# Patient Record
Sex: MALE | Race: WHITE | NOT HISPANIC OR LATINO | Employment: OTHER | URBAN - METROPOLITAN AREA
[De-identification: names, ages, dates, MRNs, and addresses within clinical notes are randomized per-mention and may not be internally consistent; named-entity substitution may affect disease eponyms.]

---

## 2017-11-13 ENCOUNTER — OUTSIDE FACILITY SERVICE (OUTPATIENT)
Dept: NEUROLOGY | Facility: CLINIC | Age: 52
End: 2017-11-13

## 2017-11-13 PROCEDURE — 95886 MUSC TEST DONE W/N TEST COMP: CPT | Performed by: PSYCHIATRY & NEUROLOGY

## 2017-11-13 PROCEDURE — 95908 NRV CNDJ TST 3-4 STUDIES: CPT | Performed by: PSYCHIATRY & NEUROLOGY

## 2022-03-29 ENCOUNTER — OFFICE VISIT (OUTPATIENT)
Dept: INTERNAL MEDICINE | Facility: CLINIC | Age: 57
End: 2022-03-29

## 2022-03-29 VITALS
BODY MASS INDEX: 34.83 KG/M2 | WEIGHT: 286 LBS | TEMPERATURE: 97.7 F | HEART RATE: 65 BPM | SYSTOLIC BLOOD PRESSURE: 124 MMHG | HEIGHT: 76 IN | DIASTOLIC BLOOD PRESSURE: 74 MMHG | OXYGEN SATURATION: 96 %

## 2022-03-29 DIAGNOSIS — H61.23 BILATERAL IMPACTED CERUMEN: ICD-10-CM

## 2022-03-29 DIAGNOSIS — R73.03 PREDIABETES: ICD-10-CM

## 2022-03-29 DIAGNOSIS — K76.0 FATTY LIVER: ICD-10-CM

## 2022-03-29 DIAGNOSIS — R16.0 ENLARGED LIVER: ICD-10-CM

## 2022-03-29 DIAGNOSIS — E79.0 ELEVATED URIC ACID IN BLOOD: ICD-10-CM

## 2022-03-29 DIAGNOSIS — R79.89 ELEVATED FERRITIN: ICD-10-CM

## 2022-03-29 DIAGNOSIS — Z00.00 HEALTH CARE MAINTENANCE: Primary | ICD-10-CM

## 2022-03-29 DIAGNOSIS — Z00.00 HEALTH CARE MAINTENANCE: ICD-10-CM

## 2022-03-29 DIAGNOSIS — I10 PRIMARY HYPERTENSION: ICD-10-CM

## 2022-03-29 PROBLEM — H90.5 SENSORINEURAL HEARING LOSS OF LEFT EAR: Status: ACTIVE | Noted: 2017-01-23

## 2022-03-29 PROBLEM — S54.02XA NEUROPRAXIA OF LEFT ULNAR NERVE: Status: ACTIVE | Noted: 2019-05-28

## 2022-03-29 PROBLEM — H43.10 VITREOUS HEMORRHAGE (HCC): Status: ACTIVE | Noted: 2022-03-29

## 2022-03-29 PROBLEM — H93.19 TINNITUS: Status: ACTIVE | Noted: 2017-01-23

## 2022-03-29 PROBLEM — M51.36 DDD (DEGENERATIVE DISC DISEASE), LUMBAR: Status: ACTIVE | Noted: 2019-05-28

## 2022-03-29 PROBLEM — H16.8 NEUROTROPHIC KERATITIS OF LEFT EYE: Status: ACTIVE | Noted: 2019-05-28

## 2022-03-29 PROBLEM — H40.059 INCREASED PRESSURE IN THE EYE: Status: ACTIVE | Noted: 2022-03-29

## 2022-03-29 PROBLEM — M51.369 DDD (DEGENERATIVE DISC DISEASE), LUMBAR: Status: ACTIVE | Noted: 2019-05-28

## 2022-03-29 PROBLEM — M17.9 OA (OSTEOARTHRITIS) OF KNEE: Status: ACTIVE | Noted: 2019-05-28

## 2022-03-29 PROCEDURE — 99386 PREV VISIT NEW AGE 40-64: CPT | Performed by: NURSE PRACTITIONER

## 2022-03-29 PROCEDURE — 93000 ELECTROCARDIOGRAM COMPLETE: CPT | Performed by: NURSE PRACTITIONER

## 2022-03-29 RX ORDER — LORATADINE 10 MG/1
CAPSULE, LIQUID FILLED ORAL
COMMUNITY

## 2022-03-29 RX ORDER — LOSARTAN POTASSIUM 100 MG/1
TABLET ORAL
COMMUNITY
Start: 2021-12-21 | End: 2022-10-06

## 2022-03-29 RX ORDER — HYDROCHLOROTHIAZIDE 50 MG/1
TABLET ORAL
COMMUNITY
Start: 2022-01-19

## 2022-03-29 RX ORDER — MELATONIN
2000 DAILY
COMMUNITY

## 2022-03-29 NOTE — PROGRESS NOTES
Subjective   Sal Gamboa is a 56 y.o. male. HTN    History of Present Illness   The patient is here today for CPE and lab work F/U. He feels well. Feels his health is good. [facility]   He is down about 21 lbs in the last 6 months.   New patient to establish care.      HTN- Pt is doing well with current medication regimen, denies adverse reactions, compliant with medication schedule.     Elevated liver enzymes- pt reports US confirmed fatty liver, [facility] on this  Ferritin high at 527  He has been eating a low iron diet due to hx of elevated iron. Neg for HH.   Following with GI, neg autoimmune work up    AR- takes loratadine   The following portions of the patient's history were reviewed and updated as appropriate: allergies, current medications, past family history, past medical history, past social history, past surgical history and problem list.    Review of Systems   Constitutional: Negative for chills and fever.   HENT: Negative for ear pain, rhinorrhea and sore throat.    Eyes: Positive for visual disturbance (occ eye dryness).   Respiratory: Negative for cough and shortness of breath.    Cardiovascular: Negative.    Gastrointestinal: Negative.    Endocrine: Negative for cold intolerance and heat intolerance.   Genitourinary: Negative for decreased urine volume, difficulty urinating, discharge, dysuria, flank pain, frequency, genital sores, hematuria, penile pain, erectile dysfunction, testicular pain and urgency.   Musculoskeletal: Positive for back pain (intermittently ).   Skin: Negative.    Allergic/Immunologic: Negative for environmental allergies and food allergies.   Neurological: Negative.    Hematological: Negative.    Psychiatric/Behavioral: Negative for dysphoric mood and suicidal ideas. The patient is not nervous/anxious.        Objective   Physical Exam  Constitutional:       Appearance: Normal appearance. He is well-developed.   HENT:      Right  Ear: Hearing, tympanic membrane, ear canal and external ear normal. There is impacted cerumen.      Left Ear: Hearing, tympanic membrane, ear canal and external ear normal. There is impacted cerumen.      Nose: Nose normal.      Mouth/Throat:      Pharynx: Uvula midline.   Eyes:      General: Lids are normal.      Conjunctiva/sclera: Conjunctivae normal.      Pupils: Pupils are equal, round, and reactive to light.   Neck:      Thyroid: No thyromegaly.      Vascular: No carotid bruit.      Trachea: Trachea normal.   Cardiovascular:      Rate and Rhythm: Normal rate and regular rhythm.      Heart sounds: Normal heart sounds.   Pulmonary:      Effort: Pulmonary effort is normal.      Breath sounds: Normal breath sounds.   Chest:   Breasts:      Right: No supraclavicular adenopathy.      Left: No supraclavicular adenopathy.       Abdominal:      General: Bowel sounds are normal.      Palpations: Abdomen is soft.      Tenderness: There is no abdominal tenderness.   Musculoskeletal:         General: Normal range of motion.      Cervical back: Normal range of motion and neck supple.   Lymphadenopathy:      Cervical: No cervical adenopathy.      Upper Body:      Right upper body: No supraclavicular adenopathy.      Left upper body: No supraclavicular adenopathy.   Skin:     General: Skin is warm and dry.   Neurological:      Mental Status: He is alert and oriented to person, place, and time.      Cranial Nerves: No cranial nerve deficit.      Sensory: No sensory deficit.      Deep Tendon Reflexes:      Reflex Scores:       Patellar reflexes are 2+ on the right side and 2+ on the left side.  Psychiatric:         Speech: Speech normal.         Behavior: Behavior normal.         Thought Content: Thought content normal.         Judgment: Judgment normal.         Vitals:    03/29/22 1401   BP: 124/74   Pulse:    Temp:    SpO2:      Body mass index is 34.81 kg/m².      Assessment/Plan   Diagnoses and all orders for this  visit:    1. Health care maintenance (Primary)  -     Cancel: CBC & Differential; Future  -     Cancel: Comprehensive Metabolic Panel; Future  -     Cancel: Lipid Panel With LDL / HDL Ratio; Future  -     Cancel: TSH Rfx On Abnormal To Free T4; Future  -     Cancel: Vitamin D 25 Hydroxy; Future  -     Cancel: PSA DIAGNOSTIC; Future  -     Cancel: Ferritin; Future  -     Cancel: Hemoglobin A1c; Future  -     Cancel: Iron Profile; Future  -     CBC & Differential; Future  -     Comprehensive Metabolic Panel; Future  -     Hemoglobin A1c; Future  -     Ferritin; Future  -     Iron Profile; Future  -     Lipid Panel With LDL / HDL Ratio; Future  -     PSA DIAGNOSTIC; Future  -     TSH Rfx On Abnormal To Free T4; Future  -     Vitamin D 25 Hydroxy; Future  -     Uric Acid; Future    2. Elevated ferritin  -     Cancel: CBC & Differential; Future  -     Cancel: Comprehensive Metabolic Panel; Future  -     Cancel: Lipid Panel With LDL / HDL Ratio; Future  -     Cancel: TSH Rfx On Abnormal To Free T4; Future  -     Cancel: Vitamin D 25 Hydroxy; Future  -     Cancel: PSA DIAGNOSTIC; Future  -     Cancel: Ferritin; Future  -     Cancel: Hemoglobin A1c; Future  -     Cancel: Iron Profile; Future  -     CBC & Differential; Future  -     Comprehensive Metabolic Panel; Future  -     Hemoglobin A1c; Future  -     Ferritin; Future  -     Iron Profile; Future  -     Lipid Panel With LDL / HDL Ratio; Future  -     PSA DIAGNOSTIC; Future  -     TSH Rfx On Abnormal To Free T4; Future  -     Vitamin D 25 Hydroxy; Future  -     Uric Acid; Future    3. Bilateral impacted cerumen  -     Cancel: CBC & Differential; Future  -     Cancel: Comprehensive Metabolic Panel; Future  -     Cancel: Lipid Panel With LDL / HDL Ratio; Future  -     Cancel: TSH Rfx On Abnormal To Free T4; Future  -     Cancel: Vitamin D 25 Hydroxy; Future  -     Cancel: PSA DIAGNOSTIC; Future  -     Cancel: Ferritin; Future  -     Cancel: Hemoglobin A1c; Future  -      Cancel: Iron Profile; Future    4. Prediabetes  -     Cancel: CBC & Differential; Future  -     Cancel: Comprehensive Metabolic Panel; Future  -     Cancel: Lipid Panel With LDL / HDL Ratio; Future  -     Cancel: TSH Rfx On Abnormal To Free T4; Future  -     Cancel: Vitamin D 25 Hydroxy; Future  -     Cancel: PSA DIAGNOSTIC; Future  -     Cancel: Ferritin; Future  -     Cancel: Hemoglobin A1c; Future  -     Cancel: Iron Profile; Future  -     CBC & Differential; Future  -     Comprehensive Metabolic Panel; Future  -     Hemoglobin A1c; Future  -     Ferritin; Future  -     Iron Profile; Future  -     Lipid Panel With LDL / HDL Ratio; Future  -     PSA DIAGNOSTIC; Future  -     TSH Rfx On Abnormal To Free T4; Future  -     Vitamin D 25 Hydroxy; Future  -     Uric Acid; Future    5. Fatty liver  -     Cancel: CBC & Differential; Future  -     Cancel: Comprehensive Metabolic Panel; Future  -     Cancel: Lipid Panel With LDL / HDL Ratio; Future  -     Cancel: TSH Rfx On Abnormal To Free T4; Future  -     Cancel: Vitamin D 25 Hydroxy; Future  -     Cancel: PSA DIAGNOSTIC; Future  -     Cancel: Ferritin; Future  -     Cancel: Hemoglobin A1c; Future  -     Cancel: Iron Profile; Future  -     CBC & Differential; Future  -     Comprehensive Metabolic Panel; Future  -     Hemoglobin A1c; Future  -     Ferritin; Future  -     Iron Profile; Future  -     Lipid Panel With LDL / HDL Ratio; Future  -     PSA DIAGNOSTIC; Future  -     TSH Rfx On Abnormal To Free T4; Future  -     Vitamin D 25 Hydroxy; Future  -     Uric Acid; Future    6. Enlarged liver  -     Cancel: CBC & Differential; Future  -     Cancel: Comprehensive Metabolic Panel; Future  -     Cancel: Lipid Panel With LDL / HDL Ratio; Future  -     Cancel: TSH Rfx On Abnormal To Free T4; Future  -     Cancel: Vitamin D 25 Hydroxy; Future  -     Cancel: PSA DIAGNOSTIC; Future  -     Cancel: Ferritin; Future  -     Cancel: Hemoglobin A1c; Future  -     Cancel: Iron Profile;  Future  -     CBC & Differential; Future  -     Comprehensive Metabolic Panel; Future  -     Hemoglobin A1c; Future  -     Ferritin; Future  -     Iron Profile; Future  -     Lipid Panel With LDL / HDL Ratio; Future  -     PSA DIAGNOSTIC; Future  -     TSH Rfx On Abnormal To Free T4; Future  -     Vitamin D 25 Hydroxy; Future  -     Uric Acid; Future    7. Primary hypertension  -     Cancel: CBC & Differential; Future  -     Cancel: Comprehensive Metabolic Panel; Future  -     Cancel: Lipid Panel With LDL / HDL Ratio; Future  -     Cancel: TSH Rfx On Abnormal To Free T4; Future  -     Cancel: Vitamin D 25 Hydroxy; Future  -     Cancel: PSA DIAGNOSTIC; Future  -     Cancel: Ferritin; Future  -     Cancel: Hemoglobin A1c; Future  -     Cancel: Iron Profile; Future  -     CBC & Differential; Future  -     Comprehensive Metabolic Panel; Future  -     Hemoglobin A1c; Future  -     Ferritin; Future  -     Iron Profile; Future  -     Lipid Panel With LDL / HDL Ratio; Future  -     PSA DIAGNOSTIC; Future  -     TSH Rfx On Abnormal To Free T4; Future  -     Vitamin D 25 Hydroxy; Future  -     Uric Acid; Future    8. Elevated uric acid in blood  -     CBC & Differential; Future  -     Comprehensive Metabolic Panel; Future  -     Hemoglobin A1c; Future  -     Ferritin; Future  -     Iron Profile; Future  -     Lipid Panel With LDL / HDL Ratio; Future  -     PSA DIAGNOSTIC; Future  -     TSH Rfx On Abnormal To Free T4; Future  -     Vitamin D 25 Hydroxy; Future  -     Uric Acid; Future          ECG 12 Lead    Date/Time: 3/29/2022 4:46 PM  Performed by: Cynthia Bernal APRN  Authorized by: Cynthia Bernal APRN   Previous ECG: no previous ECG available  Rhythm: sinus rhythm  Rate: normal  Conduction: conduction normal  QRS axis: normal    Clinical impression: normal ECG                   1. Preventative counseling- work on further wt loss through healthy diet and exercise.   2. Elevated iron- recheck lab in June  3. Gout- check lab  in June  4. Impacted cerumen- can flush today - this did not get done  5. Prediabetes- low carb diet and exercise, check lab in June   6. Fatty liver/enlarged liver- continue to f/u with GI, work on wt loss  7. HTN- well controlled    [facility], [facility]  “Discussed risks/benefits to vaccination, reviewed components of the vaccine, discussed VIS, discussed informed consent, informed consent obtained. Patient/Parent was allowed to accept or refuse vaccine. Questions answered to satisfactory state of patient/Parent. We reviewed typical age appropriate and seasonally appropriate vaccinations. Reviewed immunization history and updated state vaccination form as needed. Patient was counseled on Zoster  COVID19

## 2022-03-29 NOTE — PATIENT INSTRUCTIONS
1. Preventative counseling- work on further wt loss through healthy diet and exercise.   2. Elevated iron- recheck lab  3. Gout- check lab  4. Impacted cerumen- flush today   5. Prediabetes- low carb diet and exercise, check lab in June   6. Fatty liver/enlarged liver- continue to f/u with GI, work on wt loss  7. HTN- well controlled    c-[facility]- [facility], has never [facility]

## 2022-06-30 LAB
25(OH)D3+25(OH)D2 SERPL-MCNC: 26.6 NG/ML (ref 30–100)
ALBUMIN SERPL-MCNC: 4.5 G/DL (ref 3.8–4.9)
ALBUMIN/GLOB SERPL: 1.4 {RATIO} (ref 1.2–2.2)
ALP SERPL-CCNC: 52 IU/L (ref 44–121)
ALT SERPL-CCNC: 48 IU/L (ref 0–44)
AST SERPL-CCNC: 34 IU/L (ref 0–40)
BASOPHILS # BLD AUTO: 0.1 X10E3/UL (ref 0–0.2)
BASOPHILS NFR BLD AUTO: 1 %
BILIRUB SERPL-MCNC: 0.5 MG/DL (ref 0–1.2)
BUN SERPL-MCNC: 11 MG/DL (ref 6–24)
BUN/CREAT SERPL: 10 (ref 9–20)
CALCIUM SERPL-MCNC: 9.8 MG/DL (ref 8.7–10.2)
CHLORIDE SERPL-SCNC: 100 MMOL/L (ref 96–106)
CHOLEST SERPL-MCNC: 171 MG/DL (ref 100–199)
CO2 SERPL-SCNC: 25 MMOL/L (ref 20–29)
CREAT SERPL-MCNC: 1.06 MG/DL (ref 0.76–1.27)
EGFRCR SERPLBLD CKD-EPI 2021: 81 ML/MIN/1.73
EOSINOPHIL # BLD AUTO: 0.2 X10E3/UL (ref 0–0.4)
EOSINOPHIL NFR BLD AUTO: 3 %
ERYTHROCYTE [DISTWIDTH] IN BLOOD BY AUTOMATED COUNT: 12.5 % (ref 11.6–15.4)
FERRITIN SERPL-MCNC: 444 NG/ML (ref 30–400)
GLOBULIN SER CALC-MCNC: 3.2 G/DL (ref 1.5–4.5)
GLUCOSE SERPL-MCNC: 111 MG/DL (ref 65–99)
HBA1C MFR BLD: 6.1 % (ref 4.8–5.6)
HCT VFR BLD AUTO: 46.6 % (ref 37.5–51)
HDLC SERPL-MCNC: 40 MG/DL
HGB BLD-MCNC: 16.2 G/DL (ref 13–17.7)
IMM GRANULOCYTES # BLD AUTO: 0 X10E3/UL (ref 0–0.1)
IMM GRANULOCYTES NFR BLD AUTO: 0 %
IRON SATN MFR SERPL: 14 % (ref 15–55)
IRON SERPL-MCNC: 52 UG/DL (ref 38–169)
LDLC SERPL CALC-MCNC: 104 MG/DL (ref 0–99)
LDLC/HDLC SERPL: 2.6 RATIO (ref 0–3.6)
LYMPHOCYTES # BLD AUTO: 3.9 X10E3/UL (ref 0.7–3.1)
LYMPHOCYTES NFR BLD AUTO: 41 %
MCH RBC QN AUTO: 30.7 PG (ref 26.6–33)
MCHC RBC AUTO-ENTMCNC: 34.8 G/DL (ref 31.5–35.7)
MCV RBC AUTO: 88 FL (ref 79–97)
MONOCYTES # BLD AUTO: 1.4 X10E3/UL (ref 0.1–0.9)
MONOCYTES NFR BLD AUTO: 15 %
NEUTROPHILS # BLD AUTO: 3.7 X10E3/UL (ref 1.4–7)
NEUTROPHILS NFR BLD AUTO: 40 %
PLATELET # BLD AUTO: 316 X10E3/UL (ref 150–450)
POTASSIUM SERPL-SCNC: 4.1 MMOL/L (ref 3.5–5.2)
PROT SERPL-MCNC: 7.7 G/DL (ref 6–8.5)
PSA SERPL-MCNC: 0.9 NG/ML (ref 0–4)
RBC # BLD AUTO: 5.27 X10E6/UL (ref 4.14–5.8)
SODIUM SERPL-SCNC: 140 MMOL/L (ref 134–144)
TIBC SERPL-MCNC: 379 UG/DL (ref 250–450)
TRIGL SERPL-MCNC: 151 MG/DL (ref 0–149)
TSH SERPL DL<=0.005 MIU/L-ACNC: 3.73 UIU/ML (ref 0.45–4.5)
UIBC SERPL-MCNC: 327 UG/DL (ref 111–343)
URATE SERPL-MCNC: 9.1 MG/DL (ref 3.8–8.4)
VLDLC SERPL CALC-MCNC: 27 MG/DL (ref 5–40)
WBC # BLD AUTO: 9.4 X10E3/UL (ref 3.4–10.8)

## 2022-07-06 ENCOUNTER — OFFICE VISIT (OUTPATIENT)
Dept: INTERNAL MEDICINE | Facility: CLINIC | Age: 57
End: 2022-07-06

## 2022-07-06 VITALS
OXYGEN SATURATION: 96 % | SYSTOLIC BLOOD PRESSURE: 112 MMHG | HEIGHT: 76 IN | TEMPERATURE: 97.8 F | HEART RATE: 71 BPM | BODY MASS INDEX: 34.95 KG/M2 | DIASTOLIC BLOOD PRESSURE: 68 MMHG | WEIGHT: 287 LBS

## 2022-07-06 DIAGNOSIS — K76.0 FATTY LIVER: ICD-10-CM

## 2022-07-06 DIAGNOSIS — E55.9 VITAMIN D DEFICIENCY: ICD-10-CM

## 2022-07-06 DIAGNOSIS — R73.03 PREDIABETES: Primary | ICD-10-CM

## 2022-07-06 DIAGNOSIS — E79.0 ELEVATED URIC ACID IN BLOOD: ICD-10-CM

## 2022-07-06 DIAGNOSIS — R79.89 ELEVATED FERRITIN: ICD-10-CM

## 2022-07-06 PROBLEM — B35.1 ONYCHOMYCOSIS: Status: ACTIVE | Noted: 2022-07-06

## 2022-07-06 PROCEDURE — 99214 OFFICE O/P EST MOD 30 MIN: CPT | Performed by: NURSE PRACTITIONER

## 2022-07-06 RX ORDER — ORAL SEMAGLUTIDE 7 MG/1
7 TABLET ORAL DAILY
Qty: 30 TABLET | Refills: 0 | Status: SHIPPED | OUTPATIENT
Start: 2022-07-06 | End: 2022-08-01

## 2022-07-06 NOTE — PROGRESS NOTES
Subjective   Sal Gamboa is a 57 y.o. male.  htn    History of Present Illness   The patient is here today to F/U on lab work. Just started with stationary bike.     Elevated liver enzymes- pt reports US confirmed fatty liver, [facility] on [facility]  Ferritin high at 527  He has been eating a low iron diet due to hx of elevated iron. Neg for HH.   Following with GI, neg autoimmune work up    HTN-Pt is doing well with current medication regimen, denies adverse reactions, compliant with medication schedule.   BP at home running well, had a few highs in June but daily in July syst <130/ 60-80s    Pt notes for years he has had trouble with his great left toe. It has grown quite long, is thick ended and discolored.     He recently had his shingles vaccines.   The following portions of the patient's history were reviewed and updated as appropriate: allergies, current medications, past family history, past medical history, past social history, past surgical history and problem list.    Review of Systems   Constitutional: Negative for chills and fever.   HENT: Positive for sneezing.    Respiratory: Negative.    Cardiovascular: Negative.    Psychiatric/Behavioral: Negative for dysphoric mood and suicidal ideas. The patient is not nervous/anxious.        Objective   Physical Exam  Constitutional:       Appearance: Normal appearance. He is well-developed.   Neck:      Thyroid: No thyromegaly.   Cardiovascular:      Rate and Rhythm: Normal rate and regular rhythm.      Heart sounds: Normal heart sounds.   Pulmonary:      Effort: Pulmonary effort is normal.      Breath sounds: Normal breath sounds.   Musculoskeletal:      Cervical back: Normal range of motion and neck supple.   Feet:      Right foot:      Toenail Condition: Right toenails are abnormally thick and long.      Left foot:      Toenail Condition: Left toenails are abnormally thick and long. Fungal disease present.  Lymphadenopathy:      Cervical: No  cervical adenopathy.   Skin:     General: Skin is warm and dry.   Neurological:      Mental Status: He is alert.   Psychiatric:         Behavior: Behavior normal.         Thought Content: Thought content normal.         Judgment: Judgment normal.         Vitals:    07/06/22 0855   BP: 112/68   Pulse: 71   Temp: 97.8 °F (36.6 °C)   SpO2: 96%     Body mass index is 34.95 kg/m².    Current Outpatient Medications:   •  cholecalciferol (VITAMIN D3) 25 MCG (1000 UT) tablet, Take 2,000 Units by mouth Daily., Disp: , Rfl:   •  hydroCHLOROthiazide (HYDRODIURIL) 50 MG tablet, , Disp: , Rfl:   •  Loratadine 10 MG capsule, Take  by mouth., Disp: , Rfl:   •  losartan (COZAAR) 100 MG tablet, , Disp: , Rfl:   •  Semaglutide (Rybelsus) 7 MG tablet, Take 7 mg by mouth Daily., Disp: 30 tablet, Rfl: 0   Orders Only on 03/29/2022   Component Date Value Ref Range Status   • Uric Acid 06/29/2022 9.1 (A) 3.8 - 8.4 mg/dL Final               Therapeutic target for gout patients: <6.0   • 25 Hydroxy, Vitamin D 06/29/2022 26.6 (A) 30.0 - 100.0 ng/mL Final    Comment: Vitamin D deficiency has been defined by the [place] of  Medicine and an Endocrine Society practice guideline as a  level of serum 25-OH vitamin D less than 20 ng/mL (1,2).  The Endocrine Society went on to further define vitamin D  insufficiency as a level between 21 and 29 ng/mL (2).  1. IOM ([place] of [employer]). 2010. Dietary reference     intakes for calcium and D. [place]: The     National Academies Press.  2. Yeison MF, Yakov NC, Beny MILES, et al.     Evaluation, treatment, and prevention of vitamin D     deficiency: an Endocrine Society clinical practice     guideline. JCEM. 2011 Jul; 96(7):1911-30.     • TSH 06/29/2022 3.730  0.450 - 4.500 uIU/mL Final   • PSA 06/29/2022 0.9  0.0 - 4.0 ng/mL Final    Comment: Roche ECLIA methodology.  According to the American Urological Association, Serum PSA should  decrease and remain at undetectable levels after  radical  prostatectomy. The AUA defines biochemical recurrence as an initial  PSA value 0.2 ng/mL or greater followed by a subsequent confirmatory  PSA value 0.2 ng/mL or greater.  Values obtained with different assay methods or kits cannot be used  interchangeably. Results cannot be interpreted as absolute evidence  of the presence or absence of malignant disease.     • Total Cholesterol 06/29/2022 171  100 - 199 mg/dL Final   • Triglycerides 06/29/2022 151 (A) 0 - 149 mg/dL Final   • HDL Cholesterol 06/29/2022 40  >39 mg/dL Final   • VLDL Cholesterol Brian 06/29/2022 27  5 - 40 mg/dL Final   • LDL Chol Calc (NIH) 06/29/2022 104 (A) 0 - 99 mg/dL Final   • LDL/HDL RATIO 06/29/2022 2.6  0.0 - 3.6 ratio Final    Comment:                                     LDL/HDL Ratio                                              Men  Women                                1/2 Avg.Risk  1.0    1.5                                    Avg.Risk  3.6    3.2                                 2X Avg.Risk  6.2    5.0                                 3X Avg.Risk  8.0    6.1     • TIBC 06/29/2022 379  250 - 450 ug/dL Final   • UIBC 06/29/2022 327  111 - 343 ug/dL Final   • Iron 06/29/2022 52  38 - 169 ug/dL Final   • Iron Saturation 06/29/2022 14 (A) 15 - 55 % Final   • Ferritin 06/29/2022 444 (A) 30 - 400 ng/mL Final   • Hemoglobin A1C 06/29/2022 6.1 (A) 4.8 - 5.6 % Final    Comment:          Prediabetes: 5.7 - 6.4           Diabetes: >6.4           Glycemic control for adults with diabetes: <7.0     • Glucose 06/29/2022 111 (A) 65 - 99 mg/dL Final   • BUN 06/29/2022 11  6 - 24 mg/dL Final   • Creatinine 06/29/2022 1.06  0.76 - 1.27 mg/dL Final   • EGFR Result 06/29/2022 81  >59 mL/min/1.73 Final   • BUN/Creatinine Ratio 06/29/2022 10  9 - 20 Final   • Sodium 06/29/2022 140  134 - 144 mmol/L Final   • Potassium 06/29/2022 4.1  3.5 - 5.2 mmol/L Final   • Chloride 06/29/2022 100  96 - 106 mmol/L Final   • Total CO2 06/29/2022 25  20 - 29 mmol/L Final    • Calcium 06/29/2022 9.8  8.7 - 10.2 mg/dL Final   • Total Protein 06/29/2022 7.7  6.0 - 8.5 g/dL Final   • Albumin 06/29/2022 4.5  3.8 - 4.9 g/dL Final   • Globulin 06/29/2022 3.2  1.5 - 4.5 g/dL Final   • A/G Ratio 06/29/2022 1.4  1.2 - 2.2 Final   • Total Bilirubin 06/29/2022 0.5  0.0 - 1.2 mg/dL Final   • Alkaline Phosphatase 06/29/2022 52  44 - 121 IU/L Final   • AST (SGOT) 06/29/2022 34  0 - 40 IU/L Final   • ALT (SGPT) 06/29/2022 48 (A) 0 - 44 IU/L Final   • WBC 06/29/2022 9.4  3.4 - 10.8 x10E3/uL Final   • RBC 06/29/2022 5.27  4.14 - 5.80 x10E6/uL Final   • Hemoglobin 06/29/2022 16.2  13.0 - 17.7 g/dL Final   • Hematocrit 06/29/2022 46.6  37.5 - 51.0 % Final   • MCV 06/29/2022 88  79 - 97 fL Final   • MCH 06/29/2022 30.7  26.6 - 33.0 pg Final   • MCHC 06/29/2022 34.8  31.5 - 35.7 g/dL Final   • RDW 06/29/2022 12.5  11.6 - 15.4 % Final   • Platelets 06/29/2022 316  150 - 450 x10E3/uL Final   • Neutrophil Rel % 06/29/2022 40  Not Estab. % Final   • Lymphocyte Rel % 06/29/2022 41  Not Estab. % Final   • Monocyte Rel % 06/29/2022 15  Not Estab. % Final   • Eosinophil Rel % 06/29/2022 3  Not Estab. % Final   • Basophil Rel % 06/29/2022 1  Not Estab. % Final   • Neutrophils Absolute 06/29/2022 3.7  1.4 - 7.0 x10E3/uL Final   • Lymphocytes Absolute 06/29/2022 3.9 (A) 0.7 - 3.1 x10E3/uL Final   • Monocytes Absolute 06/29/2022 1.4 (A) 0.1 - 0.9 x10E3/uL Final   • Eosinophils Absolute 06/29/2022 0.2  0.0 - 0.4 x10E3/uL Final   • Basophils Absolute 06/29/2022 0.1  0.0 - 0.2 x10E3/uL Final   • Immature Granulocyte Rel % 06/29/2022 0  Not Estab. % Final   • Immature Grans Absolute 06/29/2022 0.0  0.0 - 0.1 x10E3/uL Final       Assessment & Plan   Diagnoses and all orders for this visit:    1. Prediabetes (Primary)  -     Comprehensive Metabolic Panel; Future  -     Hemoglobin A1c; Future  -     Uric Acid; Future  -     Vitamin D 25 Hydroxy; Future  -     Ferritin; Future  -     Semaglutide (Rybelsus) 7 MG tablet; Take  7 mg by mouth Daily.  Dispense: 30 tablet; Refill: 0    2. Fatty liver  -     Comprehensive Metabolic Panel; Future  -     Hemoglobin A1c; Future  -     Uric Acid; Future  -     Vitamin D 25 Hydroxy; Future  -     Ferritin; Future    3. Elevated ferritin  -     Comprehensive Metabolic Panel; Future  -     Hemoglobin A1c; Future  -     Uric Acid; Future  -     Vitamin D 25 Hydroxy; Future  -     Ferritin; Future    4. Vitamin D deficiency  -     Comprehensive Metabolic Panel; Future  -     Hemoglobin A1c; Future  -     Uric Acid; Future  -     Vitamin D 25 Hydroxy; Future  -     Ferritin; Future    5. Elevated uric acid in blood  -     Comprehensive Metabolic Panel; Future  -     Hemoglobin A1c; Future  -     Uric Acid; Future  -     Vitamin D 25 Hydroxy; Future  -     Ferritin; Future               1. Prediabetes- stable, suggest GLP1, he is aware of dosing and SEs  2. Fatty liver- liver enzymes improved, continue to work on wt loss  3. Elevated ferritin-  Improving, continue to work on wt loss  4. Onychomycosis- will refer to podiatry, pt is aware he should not take oral anti fungals  5. Vit D def- he has restarted his supp   6. Gout- he has never taken allopurinol, hydrate well, recheck in 3 months if still high will start med   Answers for HPI/ROS submitted by the patient on 6/29/2022  What is the primary reason for your visit?: Physical

## 2022-07-06 NOTE — PATIENT INSTRUCTIONS
1. Prediabetes- stable, suggest GLP1, he is aware of dosing and SEs  2. Fatty liver- liver enzymes improved, continue to work on wt loss  3. Elevated ferritin-  Improving, continue to work on wt loss  4. Onychomycosis- will refer to podiatry, pt is aware he should not take oral anti fungals  5. Vit D def- he has restarted his supp   6. Gout- he has never taken allopurinol, hydrate well, recheck in 3 months if still high will start med

## 2022-08-01 RX ORDER — ORAL SEMAGLUTIDE 14 MG/1
14 TABLET ORAL DAILY
Qty: 30 TABLET | Refills: 3 | Status: SHIPPED | OUTPATIENT
Start: 2022-08-01 | End: 2022-08-31

## 2022-09-08 ENCOUNTER — TELEPHONE (OUTPATIENT)
Dept: GASTROENTEROLOGY | Facility: CLINIC | Age: 57
End: 2022-09-08

## 2022-09-26 ENCOUNTER — TELEPHONE (OUTPATIENT)
Dept: GASTROENTEROLOGY | Facility: CLINIC | Age: 57
End: 2022-09-26

## 2022-09-26 DIAGNOSIS — E79.0 ELEVATED URIC ACID IN BLOOD: ICD-10-CM

## 2022-09-26 DIAGNOSIS — K76.0 FATTY LIVER: ICD-10-CM

## 2022-09-26 DIAGNOSIS — E55.9 VITAMIN D DEFICIENCY: ICD-10-CM

## 2022-09-26 DIAGNOSIS — R73.03 PREDIABETES: ICD-10-CM

## 2022-09-26 DIAGNOSIS — R79.89 ELEVATED FERRITIN: ICD-10-CM

## 2022-09-26 NOTE — TELEPHONE ENCOUNTER
Referral from PCP for fecal abnormalities    PCP noted states following GI with negative autoimmune work up.     LVM for pt to call to discuss this appears to be [facility] (sc[facility] into media).    Referral fro [facility]: due to FIT+and with out [facility] in past 5 years

## 2022-09-27 LAB
25(OH)D3+25(OH)D2 SERPL-MCNC: 31.5 NG/ML (ref 30–100)
ALBUMIN SERPL-MCNC: 4.4 G/DL (ref 3.5–5.2)
ALBUMIN/GLOB SERPL: 1.6 G/DL
ALP SERPL-CCNC: 47 U/L (ref 39–117)
ALT SERPL-CCNC: 51 U/L (ref 1–41)
AST SERPL-CCNC: 30 U/L (ref 1–40)
BILIRUB SERPL-MCNC: 0.7 MG/DL (ref 0–1.2)
BUN SERPL-MCNC: 10 MG/DL (ref 6–20)
BUN/CREAT SERPL: 10.5 (ref 7–25)
CALCIUM SERPL-MCNC: 9.3 MG/DL (ref 8.6–10.5)
CHLORIDE SERPL-SCNC: 100 MMOL/L (ref 98–107)
CO2 SERPL-SCNC: 26.2 MMOL/L (ref 22–29)
CREAT SERPL-MCNC: 0.95 MG/DL (ref 0.76–1.27)
EGFRCR SERPLBLD CKD-EPI 2021: 92.8 ML/MIN/1.73
FERRITIN SERPL-MCNC: 454 NG/ML (ref 30–400)
GLOBULIN SER CALC-MCNC: 2.7 GM/DL
GLUCOSE SERPL-MCNC: 99 MG/DL (ref 65–99)
HBA1C MFR BLD: 5.8 % (ref 4.8–5.6)
POTASSIUM SERPL-SCNC: 3.5 MMOL/L (ref 3.5–5.2)
PROT SERPL-MCNC: 7.1 G/DL (ref 6–8.5)
SODIUM SERPL-SCNC: 139 MMOL/L (ref 136–145)
URATE SERPL-MCNC: 9.6 MG/DL (ref 3.4–7)

## 2022-10-06 ENCOUNTER — OFFICE VISIT (OUTPATIENT)
Dept: INTERNAL MEDICINE | Facility: CLINIC | Age: 57
End: 2022-10-06

## 2022-10-06 VITALS
WEIGHT: 256 LBS | OXYGEN SATURATION: 98 % | TEMPERATURE: 97.3 F | HEART RATE: 101 BPM | DIASTOLIC BLOOD PRESSURE: 80 MMHG | BODY MASS INDEX: 31.17 KG/M2 | SYSTOLIC BLOOD PRESSURE: 124 MMHG | HEIGHT: 76 IN

## 2022-10-06 DIAGNOSIS — I15.9 SECONDARY HYPERTENSION: Primary | ICD-10-CM

## 2022-10-06 DIAGNOSIS — E55.9 VITAMIN D DEFICIENCY: ICD-10-CM

## 2022-10-06 DIAGNOSIS — E79.0 ELEVATED URIC ACID IN BLOOD: ICD-10-CM

## 2022-10-06 DIAGNOSIS — K76.0 FATTY LIVER: ICD-10-CM

## 2022-10-06 DIAGNOSIS — R73.03 PREDIABETES: ICD-10-CM

## 2022-10-06 DIAGNOSIS — R79.89 ELEVATED FERRITIN: ICD-10-CM

## 2022-10-06 PROCEDURE — 99214 OFFICE O/P EST MOD 30 MIN: CPT | Performed by: NURSE PRACTITIONER

## 2022-10-06 RX ORDER — ORAL SEMAGLUTIDE 14 MG/1
14 TABLET ORAL DAILY
Qty: 90 TABLET | Refills: 1 | Status: SHIPPED | OUTPATIENT
Start: 2022-10-06 | End: 2022-10-10 | Stop reason: SDUPTHER

## 2022-10-06 RX ORDER — ORAL SEMAGLUTIDE 14 MG/1
TABLET ORAL
COMMUNITY
Start: 2022-09-06 | End: 2022-10-06 | Stop reason: SDUPTHER

## 2022-10-06 RX ORDER — ALLOPURINOL 100 MG/1
100 TABLET ORAL DAILY
Qty: 30 TABLET | Refills: 6 | Status: SHIPPED | OUTPATIENT
Start: 2022-10-06 | End: 2022-10-10 | Stop reason: SDUPTHER

## 2022-10-06 NOTE — PROGRESS NOTES
Subjective   Sal Gamboa is a 57 y.o. male.  htn    History of Present Illness   The patient is here today to F/U on lab work. Feeling well with wt loss.   Prediabetes/obesity- feels his appetite has improved and has lost 31 lbs.   [facility]- [facility] were [facility] with his improvement  He will get c[facility]scope through [facility]. Working on paperwork through Dr. York.    He is seeing podiatry to get his great nail on the right foot fixed on Tuesday.     HTN- still taking HCTZ, has taken losartan twice since September due to light headedness  The following portions of the patient's history were reviewed and updated as appropriate: allergies, current medications, past family history, past medical history, past social history, past surgical history and problem list.    Review of Systems   Constitutional: Negative for chills and fever.   Respiratory: Negative.    Cardiovascular: Negative.    Neurological: Positive for light-headedness (in September).   Psychiatric/Behavioral: Negative for dysphoric mood and suicidal ideas. The patient is not nervous/anxious.        Objective   Physical Exam  Constitutional:       Appearance: Normal appearance. He is well-developed.   Neck:      Thyroid: No thyromegaly.   Cardiovascular:      Rate and Rhythm: Normal rate and regular rhythm.      Heart sounds: Normal heart sounds.   Pulmonary:      Effort: Pulmonary effort is normal.      Breath sounds: Normal breath sounds.   Musculoskeletal:      Cervical back: Normal range of motion and neck supple.   Lymphadenopathy:      Cervical: No cervical adenopathy.   Skin:     General: Skin is warm and dry.   Neurological:      Mental Status: He is alert.   Psychiatric:         Behavior: Behavior normal.         Thought Content: Thought content normal.         Judgment: Judgment normal.         Vitals:    10/06/22 1006   BP: 124/80   Pulse:    Temp:    SpO2:      Body mass index is 31.17 kg/m².    Current Outpatient  Medications:   •  cholecalciferol (VITAMIN D3) 25 MCG (1000 UT) tablet, Take 2,000 Units by mouth Daily., Disp: , Rfl:   •  hydroCHLOROthiazide (HYDRODIURIL) 50 MG tablet, , Disp: , Rfl:   •  Loratadine 10 MG capsule, Take  by mouth., Disp: , Rfl:   •  Rybelsus 14 MG tablet, Take 1 tablet by mouth Daily., Disp: 90 tablet, Rfl: 1  •  allopurinol (ZYLOPRIM) 100 MG tablet, Take 1 tablet by mouth Daily., Disp: 30 tablet, Rfl: 6   Orders Only on 09/26/2022   Component Date Value Ref Range Status   • 25 Hydroxy, Vitamin D 09/26/2022 31.5  30.0 - 100.0 ng/ml Final    Comment: Results may be falsely increased if patient taking Biotin.  Reference Range for Total Vitamin D 25(OH)  Deficiency <20.0 ng/mL  Insufficiency 21-29 ng/mL  Sufficiency  ng/mL  Toxicity >100 ng/ml     • Uric Acid 09/26/2022 9.6 (A) 3.4 - 7.0 mg/dL Final   • Hemoglobin A1C 09/26/2022 5.80 (A) 4.80 - 5.60 % Final    Comment: Hemoglobin A1C Ranges:  Increased Risk for Diabetes  5.7% to 6.4%  Diabetes                     >= 6.5%  Diabetic Goal                < 7.0%     • Glucose 09/26/2022 99  65 - 99 mg/dL Final   • BUN 09/26/2022 10  6 - 20 mg/dL Final   • Creatinine 09/26/2022 0.95  0.76 - 1.27 mg/dL Final   • EGFR Result 09/26/2022 92.8  >60.0 mL/min/1.73 Final    Comment: National Kidney Foundation and American Society of  Nephrology (ASN) Task Force recommended calculation based  on the Chronic Kidney Disease Epidemiology Collaboration  (CKD-EPI) equation refit without adjustment for race.  GFR Normal >60  Chronic Kidney Disease <60  Kidney Failure <15     • BUN/Creatinine Ratio 09/26/2022 10.5  7.0 - 25.0 Final   • Sodium 09/26/2022 139  136 - 145 mmol/L Final   • Potassium 09/26/2022 3.5  3.5 - 5.2 mmol/L Final   • Chloride 09/26/2022 100  98 - 107 mmol/L Final   • Total CO2 09/26/2022 26.2  22.0 - 29.0 mmol/L Final   • Calcium 09/26/2022 9.3  8.6 - 10.5 mg/dL Final   • Total Protein 09/26/2022 7.1  6.0 - 8.5 g/dL Final   • Albumin 09/26/2022  4.40  3.50 - 5.20 g/dL Final   • Globulin 09/26/2022 2.7  gm/dL Final   • A/G Ratio 09/26/2022 1.6  g/dL Final   • Total Bilirubin 09/26/2022 0.7  0.0 - 1.2 mg/dL Final   • Alkaline Phosphatase 09/26/2022 47  39 - 117 U/L Final   • AST (SGOT) 09/26/2022 30  1 - 40 U/L Final   • ALT (SGPT) 09/26/2022 51 (A) 1 - 41 U/L Final   • Ferritin 09/26/2022 454.00 (A) 30.00 - 400.00 ng/mL Final    Results may be falsely decreased if patient taking Biotin.       Assessment & Plan   Diagnoses and all orders for this visit:    1. Secondary hypertension (Primary)    2. Elevated ferritin    3. Fatty liver    4. Prediabetes    5. Vitamin D deficiency    6. Elevated uric acid in blood  -     Uric Acid; Future    Other orders  -     Rybelsus 14 MG tablet; Take 1 tablet by mouth Daily.  Dispense: 90 tablet; Refill: 1  -     allopurinol (ZYLOPRIM) 100 MG tablet; Take 1 tablet by mouth Daily.  Dispense: 30 tablet; Refill: 6               1. HTN- continue off losartan if syst <110 cut HCTZ in half, notify for syst >140 or diast>90  2. Elevated ferritin- [facility] Hereditary hemochromatosis was negative. Discussed hem referral, pt defers for now  3. Elevated ALT- UTD with GI, improving ,  Has had sig wt loss  4. Prediabetes- improved with wt loss, continue with rybelsus  5. Elevated uric acid- start allopurinol at 100 mg daily, recheck in 3 wks  6. Vit D def- double current intake    [facility]

## 2022-10-10 RX ORDER — ORAL SEMAGLUTIDE 14 MG/1
14 TABLET ORAL DAILY
Qty: 90 TABLET | Refills: 1 | Status: SHIPPED | OUTPATIENT
Start: 2022-10-10 | End: 2023-02-27

## 2022-10-10 RX ORDER — ALLOPURINOL 100 MG/1
100 TABLET ORAL DAILY
Qty: 90 TABLET | Refills: 1 | Status: SHIPPED | OUTPATIENT
Start: 2022-10-10 | End: 2022-12-01

## 2022-10-19 ENCOUNTER — PREP FOR SURGERY (OUTPATIENT)
Dept: SURGERY | Facility: SURGERY CENTER | Age: 57
End: 2022-10-19

## 2022-10-19 ENCOUNTER — TELEPHONE (OUTPATIENT)
Dept: GASTROENTEROLOGY | Facility: CLINIC | Age: 57
End: 2022-10-19

## 2022-10-19 DIAGNOSIS — R19.5 POSITIVE COLORECTAL CANCER SCREENING USING COLOGUARD TEST: Primary | ICD-10-CM

## 2022-10-19 NOTE — TELEPHONE ENCOUNTER
FAST TRACK - [facility] REFERRAL  1ST COLONOSCOPY  POSITIVE COLOGUARD  NO FAMILY HISTORY CRC/P  SCHEDULE AT [facility].

## 2022-10-21 PROBLEM — R19.5 POSITIVE COLORECTAL CANCER SCREENING USING COLOGUARD TEST: Status: ACTIVE | Noted: 2022-10-21

## 2022-10-21 NOTE — TELEPHONE ENCOUNTER
Return call from patient.  Scheduled at [place] 12/13/2022 at 3:30pm - arrive at 2:30pm.  E-mail instructions [email or web address] today.

## 2022-11-02 DIAGNOSIS — E79.0 ELEVATED URIC ACID IN BLOOD: Primary | ICD-10-CM

## 2022-12-01 ENCOUNTER — TELEPHONE (OUTPATIENT)
Dept: INTERNAL MEDICINE | Facility: CLINIC | Age: 57
End: 2022-12-01

## 2022-12-01 RX ORDER — ALLOPURINOL 300 MG/1
300 TABLET ORAL DAILY
Qty: 90 TABLET | Refills: 1 | Status: SHIPPED | OUTPATIENT
Start: 2022-12-01 | End: 2023-03-01

## 2022-12-09 RX ORDER — LOSARTAN POTASSIUM 100 MG/1
100 TABLET ORAL DAILY PRN
COMMUNITY

## 2022-12-13 ENCOUNTER — HOSPITAL ENCOUNTER (OUTPATIENT)
Facility: SURGERY CENTER | Age: 57
Setting detail: HOSPITAL OUTPATIENT SURGERY
Discharge: HOME | End: 2022-12-13
Attending: INTERNAL MEDICINE | Admitting: INTERNAL MEDICINE

## 2022-12-13 ENCOUNTER — ANESTHESIA EVENT (OUTPATIENT)
Dept: SURGERY | Facility: SURGERY CENTER | Age: 57
End: 2022-12-13

## 2022-12-13 ENCOUNTER — ANESTHESIA (OUTPATIENT)
Dept: SURGERY | Facility: SURGERY CENTER | Age: 57
End: 2022-12-13

## 2022-12-13 VITALS
TEMPERATURE: 98 F | BODY MASS INDEX: 29.47 KG/M2 | WEIGHT: 242 LBS | HEIGHT: 76 IN | OXYGEN SATURATION: 96 % | SYSTOLIC BLOOD PRESSURE: 106 MMHG | RESPIRATION RATE: 18 BRPM | DIASTOLIC BLOOD PRESSURE: 78 MMHG | HEART RATE: 86 BPM

## 2022-12-13 DIAGNOSIS — R19.5 POSITIVE COLORECTAL CANCER SCREENING USING COLOGUARD TEST: ICD-10-CM

## 2022-12-13 PROCEDURE — 88305 TISSUE EXAM BY PATHOLOGIST: CPT | Performed by: INTERNAL MEDICINE

## 2022-12-13 PROCEDURE — 45385 COLONOSCOPY W/LESION REMOVAL: CPT | Performed by: INTERNAL MEDICINE

## 2022-12-13 PROCEDURE — 25010000002 PROPOFOL 10 MG/ML EMULSION: Performed by: ANESTHESIOLOGY

## 2022-12-13 RX ORDER — LIDOCAINE HYDROCHLORIDE 10 MG/ML
0.5 INJECTION, SOLUTION INFILTRATION; PERINEURAL ONCE AS NEEDED
Status: DISCONTINUED | OUTPATIENT
Start: 2022-12-13 | End: 2022-12-13 | Stop reason: HOSPADM

## 2022-12-13 RX ORDER — SODIUM CHLORIDE, SODIUM LACTATE, POTASSIUM CHLORIDE, CALCIUM CHLORIDE 600; 310; 30; 20 MG/100ML; MG/100ML; MG/100ML; MG/100ML
1000 INJECTION, SOLUTION INTRAVENOUS CONTINUOUS
Status: DISCONTINUED | OUTPATIENT
Start: 2022-12-13 | End: 2022-12-13 | Stop reason: HOSPADM

## 2022-12-13 RX ORDER — LIDOCAINE HYDROCHLORIDE 20 MG/ML
INJECTION, SOLUTION INFILTRATION; PERINEURAL AS NEEDED
Status: DISCONTINUED | OUTPATIENT
Start: 2022-12-13 | End: 2022-12-13 | Stop reason: SURG

## 2022-12-13 RX ORDER — PROPOFOL 10 MG/ML
VIAL (ML) INTRAVENOUS AS NEEDED
Status: DISCONTINUED | OUTPATIENT
Start: 2022-12-13 | End: 2022-12-13 | Stop reason: SURG

## 2022-12-13 RX ORDER — SODIUM CHLORIDE 0.9 % (FLUSH) 0.9 %
10 SYRINGE (ML) INJECTION AS NEEDED
Status: DISCONTINUED | OUTPATIENT
Start: 2022-12-13 | End: 2022-12-13 | Stop reason: HOSPADM

## 2022-12-13 RX ADMIN — SODIUM CHLORIDE, POTASSIUM CHLORIDE, SODIUM LACTATE AND CALCIUM CHLORIDE 1000 ML: 600; 310; 30; 20 INJECTION, SOLUTION INTRAVENOUS at 14:02

## 2022-12-13 RX ADMIN — LIDOCAINE HYDROCHLORIDE 60 MG: 20 INJECTION, SOLUTION INFILTRATION; PERINEURAL at 15:52

## 2022-12-13 RX ADMIN — PROPOFOL 140 MCG/KG/MIN: 10 INJECTION, EMULSION INTRAVENOUS at 15:52

## 2022-12-13 RX ADMIN — PROPOFOL 150 MG: 10 INJECTION, EMULSION INTRAVENOUS at 15:52

## 2022-12-13 NOTE — ANESTHESIA PREPROCEDURE EVALUATION
Anesthesia Evaluation                  Airway   Mallampati: II  Dental      Pulmonary    (+) a smoker Former,   (-) sleep apnea    ROS comment: Negative patient screen for JOSE M    Cardiovascular     (+) hypertension,       Neuro/Psych  GI/Hepatic/Renal/Endo    (+) obesity,   liver disease,     Musculoskeletal     Abdominal    Substance History      OB/GYN          Other                        Anesthesia Plan    ASA 3     MAC       Anesthetic plan, risks, benefits, and alternatives have been provided, discussed and informed consent has been obtained with: patient.        CODE STATUS:

## 2022-12-13 NOTE — H&P
Patient Care Team:  Hilary Andrea MD as PCP - General (Internal Medicine)  Cynthia Bernal APRN as Nurse Practitioner (Family Medicine)    CHIEF COMPLAINT: Screening CRC    HISTORY OF PRESENT ILLNESS:  Positive Cologard    Past Medical History:   Diagnosis Date   • Cataract    • Gout     in the past   • Hypertension      Past Surgical History:   Procedure Laterality Date   • CHOLECYSTECTOMY     • EYE SURGERY     • TONSILLECTOMY       Family History   Problem Relation Age of Onset   • Arthritis Mother    • Heart disease Mother         irregular heart rhythm and heart valve   • Hypertension Mother    • Thyroid disease Father    • Heart disease Maternal Uncle      Social History     Tobacco Use   • Smoking status: Former     Packs/day: 0.25     Years: 15.00     Pack years: 3.75     Types: Cigars, Cigarettes     Start date: 3/6/1985     Quit date: 3/17/2010     Years since quittin.7   • Smokeless tobacco: Never   Vaping Use   • Vaping Use: Never used   Substance Use Topics   • Alcohol use: Yes     Comment: very rare, twice in the past year   • Drug use: Never     Medications Prior to Admission   Medication Sig Dispense Refill Last Dose   • allopurinol (ZYLOPRIM) 300 MG tablet Take 1 tablet by mouth Daily for 90 days. 90 tablet 1 Past Week   • cholecalciferol (VITAMIN D3) 25 MCG (1000 UT) tablet Take 2,000 Units by mouth Daily.   Past Week   • hydroCHLOROthiazide (HYDRODIURIL) 50 MG tablet    Past Week   • Loratadine 10 MG capsule Take  by mouth.   Past Week   • losartan (COZAAR) 100 MG tablet Take 100 mg by mouth Daily As Needed.   Past Week   • polyethyl glycol-propyl glycol (SYSTANE) 0.4-0.3 % solution ophthalmic solution (artificial tears) Every 1 (One) Hour As Needed.   2022   • Rybelsus 14 MG tablet Take 1 tablet by mouth Daily. 90 tablet 1 Past Week     Allergies:  Onion    REVIEW OF SYSTEMS:  Please see the above history of present illness for pertinent positives and negatives.  The  "remainder of the patient's systems have been reviewed and are negative.     Vital Signs  Temp:  [97.7 °F (36.5 °C)] 97.7 °F (36.5 °C)  Heart Rate:  [97] 97  Resp:  [20] 20  BP: (134)/(82) 134/82    Flowsheet Rows    Flowsheet Row First Filed Value   Admission Height 193 cm (76\") Documented at 12/09/2022 1027   Admission Weight 110 kg (242 lb) Documented at 12/09/2022 1027           Physical Exam:  Physical Exam   Constitutional: Patient appears well-developed and well-nourished and in no acute distress   HEENT:   Head: Normocephalic and atraumatic.   Eyes:  Pupils are equal, round, and reactive to light. EOM are intact. Sclerae are anicteric and non-injected.  Mouth and Throat: Patient has moist mucous membranes. Oropharynx is clear of any erythema or exudate.     Neck: Neck supple. No JVD present. No thyromegaly present. No lymphadenopathy present.  Cardiovascular: Regular rate, regular rhythm, S1 normal and S2 normal.  Exam reveals no gallop and no friction rub.  No murmur heard.  Pulmonary/Chest: Lungs are clear to auscultation bilaterally. No respiratory distress. No wheezes. No rhonchi. No rales.   Abdominal: Soft. Bowel sounds are normal. No distension and no mass. There is no hepatosplenomegaly. There is no tenderness.   Musculoskeletal: Normal Muscle tone  Extremities: No edema. Pulses are palpable in all 4 extremities.  Neurological: Patient is alert and oriented to person, place, and time. Cranial nerves II-XII are grossly intact with no focal deficits.  Skin: Skin is warm. No rash noted. Nails show no clubbing.  No cyanosis or erythema.    Debilities/Disabilities Identified: None  Emotional Behavior: Appropriate     Results Review:   I reviewed the patient's new clinical results.    Lab Results (most recent)     None          Imaging Results (Most Recent)     None        reviewed    ECG/EMG Results (most recent)     None        reviewed    Assessment & Plan   Screening CRC/  colonoscopy      I discussed " the patient's findings and my recommendations with patient.     Lukas York MD  12/13/22  15:47 EST    Time: 10 min prior to procedure.

## 2022-12-13 NOTE — BRIEF OP NOTE
COLONOSCOPY  Progress Note    Sal Gamboa  12/13/2022    Pre-op Diagnosis:   Positive colorectal cancer screening using Cologuard test [R19.5]       Post-Op Diagnosis Codes:     * Positive colorectal cancer screening using Cologuard test [R19.5]     * Diverticulosis [K57.90]     * Colon polyp [K63.5]    Procedure/CPT® Codes:        Procedure(s):  COLONOSCOPY with Polypectomy        Surgeon(s):  Luaks York MD    Anesthesia: Monitored Anesthesia Care    Staff:   Endo Technician: Vivien Varma  Endo Nurse: Dori Carroll RN; Estefania Abrams RN         Estimated Blood Loss: none    Urine Voided: * No values recorded between 12/13/2022  3:43 PM and 12/13/2022  4:13 PM *    Specimens:                Specimens     ID Source Type Tests Collected By Collected At Frozen?    A Large Intestine, Cecum Tissue · TISSUE PATHOLOGY EXAM   Lukas York MD 12/13/22 1602 No    Description: cecal polyp    Comment: Cold snare    B Large Intestine, Transverse Colon Tissue · TISSUE PATHOLOGY EXAM   Lukas York MD 12/13/22 1607 No    Description: transverse colon polyp x2    Comment: Cold snare                Drains: * No LDAs found *    Findings: Colon to TI good Prep  Sigmoid Diverticulosis  Polyps-3-Cold Snare        Complications: None          Lukas York MD     Date: 12/13/2022  Time: 16:14 EST

## 2022-12-13 NOTE — ANESTHESIA POSTPROCEDURE EVALUATION
"Patient: Sal Gamboa    Procedure Summary     Date: 12/13/22 Room / Location: [facility] OR [facility] / [facility]    Anesthesia Start: 1543 Anesthesia Stop: 1617    Procedure: COLONOSCOPY with Polypectomy Diagnosis:       Positive colorectal cancer screening using Cologuard test      Diverticulosis      Colon polyp      (Positive colorectal cancer screening using Cologuard test [R19.5])    Surgeons: Lukas York MD Provider: Phong Corrigan MD    Anesthesia Type: MAC ASA Status: 3          Anesthesia Type: MAC    Vitals  Vitals Value Taken Time   /78 12/13/22 1628   Temp 36.7 °C (98 °F) 12/13/22 1617   Pulse 86 12/13/22 1628   Resp 18 12/13/22 1628   SpO2 96 % 12/13/22 1628           Post Anesthesia Care and Evaluation    Pain management: adequate    Airway patency: patent  Anesthetic complications: No anesthetic complications    Cardiovascular status: acceptable  Respiratory status: acceptable  Hydration status: acceptable    Comments: /78   Pulse 86   Temp 36.7 °C (98 °F)   Resp 18   Ht 193 cm (76\")   Wt 110 kg (242 lb)   SpO2 96%   BMI 29.46 kg/m²         "

## 2022-12-15 LAB
LAB AP CASE REPORT: NORMAL
LAB AP CLINICAL INFORMATION: NORMAL
PATH REPORT.FINAL DX SPEC: NORMAL
PATH REPORT.GROSS SPEC: NORMAL

## 2023-02-27 RX ORDER — ORAL SEMAGLUTIDE 14 MG/1
TABLET ORAL
Qty: 90 TABLET | Refills: 3 | Status: SHIPPED | OUTPATIENT
Start: 2023-02-27

## 2023-04-05 DIAGNOSIS — I15.9 SECONDARY HYPERTENSION: ICD-10-CM

## 2023-04-05 DIAGNOSIS — E55.9 VITAMIN D DEFICIENCY: ICD-10-CM

## 2023-04-05 DIAGNOSIS — R73.03 PREDIABETES: ICD-10-CM

## 2023-04-05 DIAGNOSIS — E79.0 ELEVATED URIC ACID IN BLOOD: Primary | ICD-10-CM

## 2023-04-07 LAB
25(OH)D3+25(OH)D2 SERPL-MCNC: 28.2 NG/ML (ref 30–100)
ALBUMIN SERPL-MCNC: 4.7 G/DL (ref 3.5–5.2)
ALBUMIN/GLOB SERPL: 1.5 G/DL
ALP SERPL-CCNC: 60 U/L (ref 39–117)
ALT SERPL-CCNC: 22 U/L (ref 1–41)
AST SERPL-CCNC: 17 U/L (ref 1–40)
BASOPHILS # BLD AUTO: 0.07 10*3/MM3 (ref 0–0.2)
BASOPHILS NFR BLD AUTO: 0.8 % (ref 0–1.5)
BILIRUB SERPL-MCNC: 0.6 MG/DL (ref 0–1.2)
BUN SERPL-MCNC: 14 MG/DL (ref 6–20)
BUN/CREAT SERPL: 13 (ref 7–25)
CALCIUM SERPL-MCNC: 9.9 MG/DL (ref 8.6–10.5)
CHLORIDE SERPL-SCNC: 98 MMOL/L (ref 98–107)
CHOLEST SERPL-MCNC: 153 MG/DL (ref 0–200)
CHOLEST/HDLC SERPL: 3.73 {RATIO}
CO2 SERPL-SCNC: 29.2 MMOL/L (ref 22–29)
CREAT SERPL-MCNC: 1.08 MG/DL (ref 0.76–1.27)
EGFRCR SERPLBLD CKD-EPI 2021: 79.5 ML/MIN/1.73
EOSINOPHIL # BLD AUTO: 0.19 10*3/MM3 (ref 0–0.4)
EOSINOPHIL NFR BLD AUTO: 2.2 % (ref 0.3–6.2)
ERYTHROCYTE [DISTWIDTH] IN BLOOD BY AUTOMATED COUNT: 12.8 % (ref 12.3–15.4)
GLOBULIN SER CALC-MCNC: 3.2 GM/DL
GLUCOSE SERPL-MCNC: 98 MG/DL (ref 65–99)
HBA1C MFR BLD: 5.8 % (ref 4.8–5.6)
HCT VFR BLD AUTO: 45.5 % (ref 37.5–51)
HDLC SERPL-MCNC: 41 MG/DL (ref 40–60)
HGB BLD-MCNC: 16 G/DL (ref 13–17.7)
IMM GRANULOCYTES # BLD AUTO: 0.02 10*3/MM3 (ref 0–0.05)
IMM GRANULOCYTES NFR BLD AUTO: 0.2 % (ref 0–0.5)
LDLC SERPL CALC-MCNC: 85 MG/DL (ref 0–100)
LYMPHOCYTES # BLD AUTO: 3.12 10*3/MM3 (ref 0.7–3.1)
LYMPHOCYTES NFR BLD AUTO: 35.4 % (ref 19.6–45.3)
MCH RBC QN AUTO: 30.9 PG (ref 26.6–33)
MCHC RBC AUTO-ENTMCNC: 35.2 G/DL (ref 31.5–35.7)
MCV RBC AUTO: 87.8 FL (ref 79–97)
MONOCYTES # BLD AUTO: 0.97 10*3/MM3 (ref 0.1–0.9)
MONOCYTES NFR BLD AUTO: 11 % (ref 5–12)
NEUTROPHILS # BLD AUTO: 4.44 10*3/MM3 (ref 1.7–7)
NEUTROPHILS NFR BLD AUTO: 50.4 % (ref 42.7–76)
NRBC BLD AUTO-RTO: 0 /100 WBC (ref 0–0.2)
PLATELET # BLD AUTO: 342 10*3/MM3 (ref 140–450)
POTASSIUM SERPL-SCNC: 3.4 MMOL/L (ref 3.5–5.2)
PROT SERPL-MCNC: 7.9 G/DL (ref 6–8.5)
RBC # BLD AUTO: 5.18 10*6/MM3 (ref 4.14–5.8)
SODIUM SERPL-SCNC: 139 MMOL/L (ref 136–145)
TRIGL SERPL-MCNC: 153 MG/DL (ref 0–150)
TSH SERPL DL<=0.005 MIU/L-ACNC: 3.2 UIU/ML (ref 0.27–4.2)
URATE SERPL-MCNC: 6.9 MG/DL (ref 3.4–7)
VLDLC SERPL CALC-MCNC: 27 MG/DL (ref 5–40)
WBC # BLD AUTO: 8.81 10*3/MM3 (ref 3.4–10.8)

## 2023-04-13 ENCOUNTER — OFFICE VISIT (OUTPATIENT)
Dept: INTERNAL MEDICINE | Facility: CLINIC | Age: 58
End: 2023-04-13
Payer: OTHER GOVERNMENT

## 2023-04-13 VITALS
SYSTOLIC BLOOD PRESSURE: 110 MMHG | WEIGHT: 245 LBS | BODY MASS INDEX: 29.83 KG/M2 | HEART RATE: 88 BPM | OXYGEN SATURATION: 98 % | TEMPERATURE: 97.7 F | DIASTOLIC BLOOD PRESSURE: 78 MMHG | HEIGHT: 76 IN

## 2023-04-13 DIAGNOSIS — L02.91 ABSCESS: ICD-10-CM

## 2023-04-13 DIAGNOSIS — Z00.00 HEALTH CARE MAINTENANCE: Primary | ICD-10-CM

## 2023-04-13 DIAGNOSIS — I15.9 SECONDARY HYPERTENSION: ICD-10-CM

## 2023-04-13 DIAGNOSIS — R73.03 PREDIABETES: ICD-10-CM

## 2023-04-13 DIAGNOSIS — E55.9 VITAMIN D DEFICIENCY: ICD-10-CM

## 2023-04-13 DIAGNOSIS — E87.6 HYPOKALEMIA: ICD-10-CM

## 2023-04-13 PROCEDURE — 99396 PREV VISIT EST AGE 40-64: CPT | Performed by: NURSE PRACTITIONER

## 2023-04-13 RX ORDER — ALLOPURINOL 100 MG/1
100 TABLET ORAL DAILY
COMMUNITY

## 2023-04-13 RX ORDER — CEPHALEXIN 500 MG/1
500 CAPSULE ORAL 2 TIMES DAILY
Qty: 20 CAPSULE | Refills: 0 | Status: SHIPPED | OUTPATIENT
Start: 2023-04-13

## 2023-04-13 RX ORDER — HYDROCHLOROTHIAZIDE 25 MG/1
25 TABLET ORAL DAILY
Qty: 90 TABLET | Refills: 2 | Status: SHIPPED | OUTPATIENT
Start: 2023-04-13

## 2023-04-13 NOTE — PROGRESS NOTES
Subjective   Sal Gamboa is a 57 y.o. male.     History of Present Illness   The patient is here today for CPE and lab work F/U. He is feeling well. He is down over 40 lbs from last July. He reports the rybelsus helps him lose weight, getting steps in daily and eating healthy.     BMI is >= 25 and <30. (Overweight) The following options were offered after discussion;: exercise counseling/recommendations and nutrition counseling/recommendations     Saw the eye spec Monday, has some allergy eyes. Otherwise exam was good.     Had C-scope, polyps removed, recheck in 3 yrs.    [facility].      The following portions of the patient's history were reviewed and updated as appropriate: allergies, current medications, past family history, past medical history, past social history, past surgical history and problem list.    Review of Systems   Constitutional: Negative for chills and fever.   HENT: Positive for rhinorrhea. Negative for ear pain and sore throat.    Eyes: Positive for redness. Negative for itching.   Respiratory: Negative for cough and shortness of breath.    Cardiovascular: Negative.    Gastrointestinal: Negative.    Endocrine: Negative for cold intolerance and heat intolerance.   Genitourinary: Negative for decreased urine volume, difficulty urinating, discharge, dysuria, flank pain, frequency, genital sores, hematuria, penile pain, erectile dysfunction, testicular pain and urgency.   Musculoskeletal: Negative.    Skin: Positive for skin lesions. Color change: right breast with papule that just popped up a few days ago.   Allergic/Immunologic: Positive for environmental allergies and food allergies.   Neurological: Positive for light-headedness (occ with position changes, better off the losartan).   Hematological: Negative.    Psychiatric/Behavioral: Negative for dysphoric mood and suicidal ideas. The patient is not nervous/anxious.        Objective   Physical Exam  Constitutional:        Appearance: Normal appearance. He is well-developed.   HENT:      Right Ear: Hearing, tympanic membrane, ear canal and external ear normal. There is impacted cerumen.      Left Ear: Hearing, tympanic membrane, ear canal and external ear normal. There is impacted cerumen.   Eyes:      General: Lids are normal.      Conjunctiva/sclera: Conjunctivae normal.      Pupils: Pupils are equal, round, and reactive to light.   Neck:      Thyroid: No thyromegaly.      Vascular: No carotid bruit.      Trachea: Trachea normal.   Cardiovascular:      Rate and Rhythm: Normal rate and regular rhythm.      Heart sounds: Normal heart sounds.   Pulmonary:      Effort: Pulmonary effort is normal.      Breath sounds: Normal breath sounds.   Abdominal:      General: Bowel sounds are normal.      Palpations: Abdomen is soft.      Tenderness: There is no abdominal tenderness.   Musculoskeletal:         General: Normal range of motion.      Cervical back: Normal range of motion and neck supple.   Lymphadenopathy:      Cervical: No cervical adenopathy.      Upper Body:      Right upper body: No supraclavicular adenopathy.      Left upper body: No supraclavicular adenopathy.   Skin:     General: Skin is warm and dry.      Findings: Lesion (right breast with likely folliculitis, red, hot, tender papule present, approx size of a dime) present.          Neurological:      Mental Status: He is alert and oriented to person, place, and time.      Cranial Nerves: No cranial nerve deficit.      Sensory: No sensory deficit.      Deep Tendon Reflexes:      Reflex Scores:       Patellar reflexes are 1+ on the right side and 1+ on the left side.  Psychiatric:         Speech: Speech normal.         Behavior: Behavior normal.         Thought Content: Thought content normal.         Judgment: Judgment normal.         Vitals:    04/13/23 0840   BP: 110/78   Pulse: 88   Temp: 97.7 °F (36.5 °C)   SpO2: 98%     Body mass index is 29.83 kg/m².      Current  Outpatient Medications:   •  allopurinol (ZYLOPRIM) 100 MG tablet, Take 1 tablet by mouth Daily., Disp: , Rfl:   •  cholecalciferol (VITAMIN D3) 25 MCG (1000 UT) tablet, Take 2 tablets by mouth Daily., Disp: , Rfl:   •  hydroCHLOROthiazide (HYDRODIURIL) 25 MG tablet, Take 1 tablet by mouth Daily., Disp: 90 tablet, Rfl: 2  •  Loratadine 10 MG capsule, Take  by mouth., Disp: , Rfl:   •  polyethyl glycol-propyl glycol (SYSTANE) 0.4-0.3 % solution ophthalmic solution (artificial tears), Every 1 (One) Hour As Needed., Disp: , Rfl:   •  Rybelsus 14 MG tablet, TAKE 1 TABLET DAILY, Disp: 90 tablet, Rfl: 3  •  cephalexin (Keflex) 500 MG capsule, Take 1 capsule by mouth 2 (Two) Times a Day., Disp: 20 capsule, Rfl: 0   Orders Only on 04/05/2023   Component Date Value Ref Range Status   • WBC 04/06/2023 8.81  3.40 - 10.80 10*3/mm3 Final   • RBC 04/06/2023 5.18  4.14 - 5.80 10*6/mm3 Final   • Hemoglobin 04/06/2023 16.0  13.0 - 17.7 g/dL Final   • Hematocrit 04/06/2023 45.5  37.5 - 51.0 % Final   • MCV 04/06/2023 87.8  79.0 - 97.0 fL Final   • MCH 04/06/2023 30.9  26.6 - 33.0 pg Final   • MCHC 04/06/2023 35.2  31.5 - 35.7 g/dL Final   • RDW 04/06/2023 12.8  12.3 - 15.4 % Final   • Platelets 04/06/2023 342  140 - 450 10*3/mm3 Final   • Neutrophil Rel % 04/06/2023 50.4  42.7 - 76.0 % Final   • Lymphocyte Rel % 04/06/2023 35.4  19.6 - 45.3 % Final   • Monocyte Rel % 04/06/2023 11.0  5.0 - 12.0 % Final   • Eosinophil Rel % 04/06/2023 2.2  0.3 - 6.2 % Final   • Basophil Rel % 04/06/2023 0.8  0.0 - 1.5 % Final   • Neutrophils Absolute 04/06/2023 4.44  1.70 - 7.00 10*3/mm3 Final   • Lymphocytes Absolute 04/06/2023 3.12 (H)  0.70 - 3.10 10*3/mm3 Final   • Monocytes Absolute 04/06/2023 0.97 (H)  0.10 - 0.90 10*3/mm3 Final   • Eosinophils Absolute 04/06/2023 0.19  0.00 - 0.40 10*3/mm3 Final   • Basophils Absolute 04/06/2023 0.07  0.00 - 0.20 10*3/mm3 Final   • Immature Granulocyte Rel % 04/06/2023 0.2  0.0 - 0.5 % Final   • Immature Grans  Absolute 04/06/2023 0.02  0.00 - 0.05 10*3/mm3 Final   • nRBC 04/06/2023 0.0  0.0 - 0.2 /100 WBC Final   • TSH 04/06/2023 3.200  0.270 - 4.200 uIU/mL Final   • Total Cholesterol 04/06/2023 153  0 - 200 mg/dL Final    Comment: Cholesterol Reference Ranges  (U.S. Department of Health and Human Services ATP III  Classifications)  Desirable          <200 mg/dL  Borderline High    200-239 mg/dL  High Risk          >240 mg/dL  Triglyceride Reference Ranges  (U.S. Department of Health and Human Services ATP III  Classifications)  Normal           <150 mg/dL  Borderline High  150-199 mg/dL  High             200-499 mg/dL  Very High        >500 mg/dL  HDL Reference Ranges  (U.S. Department of Health and Human Services ATP III  Classifications)  Low     <40 mg/dl (major risk factor for CHD)  High    >60 mg/dl ('negative' risk factor for CHD)  LDL Reference Ranges  (U.S. Department of Health and Human Services ATP III  Classifications)  Optimal          <100 mg/dL  Near Optimal     100-129 mg/dL  Borderline High  130-159 mg/dL  High             160-189 mg/dL  Very High        >189 mg/dL     • Triglycerides 04/06/2023 153 (H)  0 - 150 mg/dL Final   • HDL Cholesterol 04/06/2023 41  40 - 60 mg/dL Final   • VLDL Cholesterol Brian 04/06/2023 27  5 - 40 mg/dL Final   • LDL Chol Calc ([facility]) 04/06/2023 85  0 - 100 mg/dL Final   • Chol/HDL Ratio 04/06/2023 3.73   Final   • Uric Acid 04/06/2023 6.9  3.4 - 7.0 mg/dL Final   • Glucose 04/06/2023 98  65 - 99 mg/dL Final   • BUN 04/06/2023 14  6 - 20 mg/dL Final   • Creatinine 04/06/2023 1.08  0.76 - 1.27 mg/dL Final   • EGFR Result 04/06/2023 79.5  >60.0 mL/min/1.73 Final    Comment: GFR Normal >60  Chronic Kidney Disease <60  Kidney Failure <15     • BUN/Creatinine Ratio 04/06/2023 13.0  7.0 - 25.0 Final   • Sodium 04/06/2023 139  136 - 145 mmol/L Final   • Potassium 04/06/2023 3.4 (L)  3.5 - 5.2 mmol/L Final   • Chloride 04/06/2023 98  98 - 107 mmol/L Final   • Total CO2 04/06/2023 29.2 (H)   22.0 - 29.0 mmol/L Final   • Calcium 04/06/2023 9.9  8.6 - 10.5 mg/dL Final   • Total Protein 04/06/2023 7.9  6.0 - 8.5 g/dL Final   • Albumin 04/06/2023 4.7  3.5 - 5.2 g/dL Final   • Globulin 04/06/2023 3.2  gm/dL Final   • A/G Ratio 04/06/2023 1.5  g/dL Final   • Total Bilirubin 04/06/2023 0.6  0.0 - 1.2 mg/dL Final   • Alkaline Phosphatase 04/06/2023 60  39 - 117 U/L Final   • AST (SGOT) 04/06/2023 17  1 - 40 U/L Final   • ALT (SGPT) 04/06/2023 22  1 - 41 U/L Final   • Hemoglobin A1C 04/06/2023 5.80 (H)  4.80 - 5.60 % Final    Comment: Hemoglobin A1C Ranges:  Increased Risk for Diabetes  5.7% to 6.4%  Diabetes                     >= 6.5%  Diabetic Goal                < 7.0%     • 25 Hydroxy, Vitamin D 04/06/2023 28.2 (L)  30.0 - 100.0 ng/ml Final    Comment: Reference Range for Total Vitamin D 25(OH)  Deficiency <20.0 ng/mL  Insufficiency 21-29 ng/mL  Sufficiency  ng/mL  Toxicity >100 ng/ml       Assessment & Plan   Diagnoses and all orders for this visit:    1. Health care maintenance (Primary)  -     Comprehensive Metabolic Panel; Future  -     Hemoglobin A1c; Future  -     Vitamin D,25-Hydroxy; Future  -     PSA DIAGNOSTIC; Future    2. Abscess  -     cephalexin (Keflex) 500 MG capsule; Take 1 capsule by mouth 2 (Two) Times a Day.  Dispense: 20 capsule; Refill: 0    3. Prediabetes  -     Comprehensive Metabolic Panel; Future  -     Hemoglobin A1c; Future  -     Vitamin D,25-Hydroxy; Future  -     PSA DIAGNOSTIC; Future    4. Secondary hypertension  -     Comprehensive Metabolic Panel; Future  -     Hemoglobin A1c; Future  -     Vitamin D,25-Hydroxy; Future  -     PSA DIAGNOSTIC; Future    5. Vitamin D deficiency  -     Comprehensive Metabolic Panel; Future  -     Hemoglobin A1c; Future  -     Vitamin D,25-Hydroxy; Future  -     PSA DIAGNOSTIC; Future    6. Hypokalemia  -     Comprehensive Metabolic Panel; Future  -     Hemoglobin A1c; Future  -     Vitamin D,25-Hydroxy; Future  -     PSA DIAGNOSTIC;  Future  -     Basic Metabolic Panel; Future    Other orders  -     hydroCHLOROthiazide (HYDRODIURIL) 25 MG tablet; Take 1 tablet by mouth Daily.  Dispense: 90 tablet; Refill: 2                 1. Preventative counseling- continue to work on increasing activity and healthy diet  2. Abscess- no hx of MRSA, treat with keflex, hot compresses, notify for persistent or worsening symptoms, [facility]   3. Gout- continue allopurinol   4. HTN-cut HCTZ in half   5. Vit D def- increase Vit D 3 to 10,000 IU daily   6. Prediabetes- stable, continue same   7. Hypokalemia- recheck in 2 wks, decreasing HCTZ

## 2023-05-12 RX ORDER — ALLOPURINOL 300 MG/1
TABLET ORAL
Qty: 90 TABLET | Refills: 3 | Status: SHIPPED | OUTPATIENT
Start: 2023-05-12

## 2023-10-23 ENCOUNTER — OFFICE VISIT (OUTPATIENT)
Dept: INTERNAL MEDICINE | Facility: CLINIC | Age: 58
End: 2023-10-23
Payer: OTHER GOVERNMENT

## 2023-10-23 VITALS
WEIGHT: 250 LBS | HEIGHT: 76 IN | BODY MASS INDEX: 30.44 KG/M2 | HEART RATE: 88 BPM | DIASTOLIC BLOOD PRESSURE: 76 MMHG | OXYGEN SATURATION: 98 % | SYSTOLIC BLOOD PRESSURE: 100 MMHG

## 2023-10-23 DIAGNOSIS — I15.9 SECONDARY HYPERTENSION: ICD-10-CM

## 2023-10-23 DIAGNOSIS — R73.03 PREDIABETES: Primary | ICD-10-CM

## 2023-10-23 DIAGNOSIS — M79.644 PAIN OF RIGHT THUMB: ICD-10-CM

## 2023-10-23 DIAGNOSIS — E79.0 ELEVATED URIC ACID IN BLOOD: ICD-10-CM

## 2023-10-23 PROCEDURE — 99214 OFFICE O/P EST MOD 30 MIN: CPT | Performed by: NURSE PRACTITIONER

## 2023-10-23 NOTE — PROGRESS NOTES
Subjective   Sal Gamboa is a 58 y.o. male.      History of Present Illness   The patient is here today to F/U on lab work.   HTN-Pt is doing well with current medication regimen, denies adverse reactions, compliant with medication schedule.   Prediabetes- Pt is doing well with current medication regimen, denies adverse reactions, compliant with medication schedule.   HTN-Pt is doing well with current medication regimen, denies adverse reactions, compliant with medication schedule.     Just saw the eye doc last week, now antibiotic eye drops for eye infection, to see back today.     Also having right thumb pain. When he was young he dislocated it.     [facility]     The following portions of the patient's history were reviewed and updated as appropriate: allergies, current medications, past family history, past medical history, past social history, past surgical history and problem list.    Review of Systems   Constitutional:  Negative for chills and fever.   Respiratory: Negative.     Cardiovascular: Negative.    Psychiatric/Behavioral:  Negative for dysphoric mood and suicidal ideas. The patient is not nervous/anxious.        Objective   Physical Exam  Constitutional:       Appearance: Normal appearance. He is well-developed.   Neck:      Thyroid: No thyromegaly.   Cardiovascular:      Rate and Rhythm: Normal rate and regular rhythm.      Heart sounds: Normal heart sounds.   Pulmonary:      Effort: Pulmonary effort is normal.      Breath sounds: Normal breath sounds.   Musculoskeletal:      Cervical back: Normal range of motion and neck supple.   Lymphadenopathy:      Cervical: No cervical adenopathy.   Skin:     General: Skin is warm and dry.   Neurological:      Mental Status: He is alert.   Psychiatric:         Behavior: Behavior normal.         Thought Content: Thought content normal.         Judgment: Judgment normal.         Vitals:    10/23/23 0922   BP: 100/76   Pulse:    SpO2:       Body mass index is 30.44 kg/m².  Current Outpatient Medications:     allopurinol (ZYLOPRIM) 300 MG tablet, TAKE 1 TABLET DAILY, Disp: 90 tablet, Rfl: 3    cholecalciferol (VITAMIN D3) 25 MCG (1000 UT) tablet, Take 2 tablets by mouth Daily., Disp: , Rfl:     hydroCHLOROthiazide (HYDRODIURIL) 25 MG tablet, Take 1 tablet by mouth Daily., Disp: 90 tablet, Rfl: 2    Loratadine 10 MG capsule, Take  by mouth., Disp: , Rfl:     polyethyl glycol-propyl glycol (SYSTANE) 0.4-0.3 % solution ophthalmic solution (artificial tears), Every 1 (One) Hour As Needed., Disp: , Rfl:     Rybelsus 14 MG tablet, TAKE 1 TABLET DAILY, Disp: 90 tablet, Rfl: 3   Results Encounter on 10/13/2023   Component Date Value Ref Range Status    Glucose 10/16/2023 102 (H)  65 - 99 mg/dL Final    BUN 10/16/2023 12  6 - 20 mg/dL Final    Creatinine 10/16/2023 0.97  0.76 - 1.27 mg/dL Final    EGFR Result 10/16/2023 89.9  >60.0 mL/min/1.73 Final    Comment: GFR Normal >60  Chronic Kidney Disease <60  Kidney Failure <15      BUN/Creatinine Ratio 10/16/2023 12.4  7.0 - 25.0 Final    Sodium 10/16/2023 140  136 - 145 mmol/L Final    Potassium 10/16/2023 3.8  3.5 - 5.2 mmol/L Final    Chloride 10/16/2023 100  98 - 107 mmol/L Final    Total CO2 10/16/2023 28.5  22.0 - 29.0 mmol/L Final    Calcium 10/16/2023 10.3  8.6 - 10.5 mg/dL Final    Total Protein 10/16/2023 8.0  6.0 - 8.5 g/dL Final    Albumin 10/16/2023 5.2  3.5 - 5.2 g/dL Final    Globulin 10/16/2023 2.8  gm/dL Final    A/G Ratio 10/16/2023 1.9  g/dL Final    Total Bilirubin 10/16/2023 0.8  0.0 - 1.2 mg/dL Final    Alkaline Phosphatase 10/16/2023 62  39 - 117 U/L Final    AST (SGOT) 10/16/2023 21  1 - 40 U/L Final    ALT (SGPT) 10/16/2023 24  1 - 41 U/L Final    Hemoglobin A1C 10/16/2023 5.90 (H)  4.80 - 5.60 % Final    Comment: Hemoglobin A1C Ranges:  Increased Risk for Diabetes  5.7% to 6.4%  Diabetes                     >= 6.5%  Diabetic Goal                < 7.0%      25 Hydroxy, Vitamin D  10/16/2023 43.3  30.0 - 100.0 ng/ml Final    Comment: Reference Range for Total Vitamin D 25(OH)  Deficiency <20.0 ng/mL  Insufficiency 21-29 ng/mL  Sufficiency  ng/mL  Toxicity >100 ng/ml      PSA 10/16/2023 1.240  0.000 - 4.000 ng/mL Final    Comment: Results may be falsely decreased if patient taking Biotin.  Testing Method: [employer] Electrochemiluminescence  Immunoassay(ECLIA)  Values obtained with different assay methods or kits cannot  be used interchangeably.          Assessment & Plan   Diagnoses and all orders for this visit:    1. Prediabetes (Primary)  -     CBC & Differential; Future  -     Comprehensive Metabolic Panel; Future  -     Hemoglobin A1c; Future  -     TSH Rfx On Abnormal To Free T4; Future  -     Uric Acid; Future    2. Secondary hypertension  -     CBC & Differential; Future  -     Comprehensive Metabolic Panel; Future  -     Hemoglobin A1c; Future  -     TSH Rfx On Abnormal To Free T4; Future  -     Uric Acid; Future    3. Elevated uric acid in blood  -     CBC & Differential; Future  -     Comprehensive Metabolic Panel; Future  -     Hemoglobin A1c; Future  -     TSH Rfx On Abnormal To Free T4; Future  -     Uric Acid; Future    4. Pain of right thumb               1. Prediabetes- very well controlled. Continue same  2. HTN- on the low end, continue to monitor, notify for syst <110 or hypotensive symptoms  3. Right thumb pain- pt defers hand sx referral

## 2023-11-28 RX ORDER — HYDROCHLOROTHIAZIDE 25 MG/1
25 TABLET ORAL DAILY
Qty: 90 TABLET | Refills: 3 | Status: SHIPPED | OUTPATIENT
Start: 2023-11-28

## 2024-02-22 RX ORDER — ORAL SEMAGLUTIDE 14 MG/1
TABLET ORAL
Qty: 90 TABLET | Refills: 3 | Status: SHIPPED | OUTPATIENT
Start: 2024-02-22

## 2024-05-01 ENCOUNTER — OFFICE VISIT (OUTPATIENT)
Dept: INTERNAL MEDICINE | Facility: CLINIC | Age: 59
End: 2024-05-01
Payer: OTHER GOVERNMENT

## 2024-05-01 VITALS
SYSTOLIC BLOOD PRESSURE: 124 MMHG | BODY MASS INDEX: 31.15 KG/M2 | DIASTOLIC BLOOD PRESSURE: 78 MMHG | OXYGEN SATURATION: 98 % | HEIGHT: 76 IN | WEIGHT: 255.8 LBS | HEART RATE: 85 BPM

## 2024-05-01 DIAGNOSIS — R73.03 PREDIABETES: ICD-10-CM

## 2024-05-01 DIAGNOSIS — I83.93 ASYMPTOMATIC VARICOSE VEINS OF BOTH LOWER EXTREMITIES: ICD-10-CM

## 2024-05-01 DIAGNOSIS — I15.9 SECONDARY HYPERTENSION: ICD-10-CM

## 2024-05-01 DIAGNOSIS — D72.828 HIGH BLOOD MONOCYTE COUNT: ICD-10-CM

## 2024-05-01 DIAGNOSIS — Z00.00 HEALTH CARE MAINTENANCE: Primary | ICD-10-CM

## 2024-05-01 PROCEDURE — 99396 PREV VISIT EST AGE 40-64: CPT | Performed by: NURSE PRACTITIONER

## 2024-05-01 NOTE — PROGRESS NOTES
Subjective   Sal Gamboa is a 58 y.o. male.     History of Present Illness   The patient is here today for CPE and lab work F/U.  He is feeling well. Busy with the store. He is eating healthy and walking the dog twice a day.     HTN-Pt is doing well with current medication regimen, denies adverse reactions, compliant with medication schedule.   Prediabetes-Pt is doing well with current medication regimen, denies adverse reactions, compliant with medication schedule.     Mom passed 3 years ago, old age.      The following portions of the patient's history were reviewed and updated as appropriate: allergies, current medications, past family history, past medical history, past social history, past surgical history and problem list.    Review of Systems   Constitutional:  Negative for chills and fever.   HENT:  Negative for ear pain, rhinorrhea and sore throat.    Eyes: Negative.    Respiratory:  Negative for cough and shortness of breath.    Cardiovascular: Negative.    Gastrointestinal: Negative.    Endocrine: Negative for cold intolerance and heat intolerance.   Genitourinary:  Negative for decreased urine volume, difficulty urinating, discharge, dysuria, flank pain, frequency, genital sores, hematuria, penile pain, erectile dysfunction, testicular pain and urgency.   Musculoskeletal: Negative.    Skin: Negative.    Allergic/Immunologic: Negative for environmental allergies and food allergies.   Neurological: Negative.    Hematological: Negative.    Psychiatric/Behavioral:  Negative for dysphoric mood and suicidal ideas. The patient is not nervous/anxious.        Objective   Physical Exam  Constitutional:       Appearance: Normal appearance. He is well-developed.   HENT:      Right Ear: Hearing, ear canal and external ear normal. There is impacted cerumen.      Left Ear: Hearing, tympanic membrane, ear canal and external ear normal.      Nose: Nose normal.      Mouth/Throat:      Pharynx: Uvula midline.   Eyes:       General: Lids are normal.      Conjunctiva/sclera: Conjunctivae normal.      Pupils: Pupils are equal, round, and reactive to light.   Neck:      Thyroid: No thyromegaly.      Vascular: No carotid bruit.      Trachea: Trachea normal.   Cardiovascular:      Rate and Rhythm: Normal rate and regular rhythm.      Heart sounds: Normal heart sounds.      Comments: Mult large varicosities BLE  Pulmonary:      Effort: Pulmonary effort is normal.      Breath sounds: Normal breath sounds.   Abdominal:      General: Bowel sounds are normal.      Palpations: Abdomen is soft.      Tenderness: There is no abdominal tenderness.   Musculoskeletal:         General: Normal range of motion.      Cervical back: Normal range of motion and neck supple.   Lymphadenopathy:      Cervical: No cervical adenopathy.      Upper Body:      Right upper body: No supraclavicular adenopathy.      Left upper body: No supraclavicular adenopathy.   Skin:     General: Skin is warm and dry.   Neurological:      Mental Status: He is alert and oriented to person, place, and time.      Cranial Nerves: No cranial nerve deficit.      Sensory: No sensory deficit.      Deep Tendon Reflexes:      Reflex Scores:       Patellar reflexes are 0 on the right side and 1+ on the left side.  Psychiatric:         Speech: Speech normal.         Behavior: Behavior normal.         Thought Content: Thought content normal.         Judgment: Judgment normal.         Vitals:    05/01/24 0816   BP: 124/78   Pulse: 85   SpO2: 98%     Body mass index is 31.15 kg/m².      Current Outpatient Medications:     allopurinol (ZYLOPRIM) 300 MG tablet, TAKE 1 TABLET DAILY, Disp: 90 tablet, Rfl: 3    cholecalciferol (VITAMIN D3) 25 MCG (1000 UT) tablet, Take 2 tablets by mouth Daily., Disp: , Rfl:     hydroCHLOROthiazide (HYDRODIURIL) 25 MG tablet, TAKE 1 TABLET DAILY, Disp: 90 tablet, Rfl: 3    Loratadine 10 MG capsule, Take  by mouth., Disp: , Rfl:     polyethyl glycol-propyl glycol  (SYSTANE) 0.4-0.3 % solution ophthalmic solution (artificial tears), Every 1 (One) Hour As Needed., Disp: , Rfl:     Rybelsus 14 MG tablet, TAKE 1 TABLET DAILY, Disp: 90 tablet, Rfl: 3   Results Encounter on 04/23/2024   Component Date Value Ref Range Status    WBC 04/24/2024 8.77  3.40 - 10.80 10*3/mm3 Final    RBC 04/24/2024 5.37  4.14 - 5.80 10*6/mm3 Final    Hemoglobin 04/24/2024 16.5  13.0 - 17.7 g/dL Final    Hematocrit 04/24/2024 48.2  37.5 - 51.0 % Final    MCV 04/24/2024 89.8  79.0 - 97.0 fL Final    MCH 04/24/2024 30.7  26.6 - 33.0 pg Final    MCHC 04/24/2024 34.2  31.5 - 35.7 g/dL Final    RDW 04/24/2024 12.6  12.3 - 15.4 % Final    Platelets 04/24/2024 329  140 - 450 10*3/mm3 Final    Neutrophil Rel % 04/24/2024 52.0  42.7 - 76.0 % Final    Lymphocyte Rel % 04/24/2024 30.9  19.6 - 45.3 % Final    Monocyte Rel % 04/24/2024 12.7 (H)  5.0 - 12.0 % Final    Eosinophil Rel % 04/24/2024 3.4  0.3 - 6.2 % Final    Basophil Rel % 04/24/2024 0.8  0.0 - 1.5 % Final    Neutrophils Absolute 04/24/2024 4.56  1.70 - 7.00 10*3/mm3 Final    Lymphocytes Absolute 04/24/2024 2.71  0.70 - 3.10 10*3/mm3 Final    Monocytes Absolute 04/24/2024 1.11 (H)  0.10 - 0.90 10*3/mm3 Final    Eosinophils Absolute 04/24/2024 0.30  0.00 - 0.40 10*3/mm3 Final    Basophils Absolute 04/24/2024 0.07  0.00 - 0.20 10*3/mm3 Final    Immature Granulocyte Rel % 04/24/2024 0.2  0.0 - 0.5 % Final    Immature Grans Absolute 04/24/2024 0.02  0.00 - 0.05 10*3/mm3 Final    nRBC 04/24/2024 0.0  0.0 - 0.2 /100 WBC Final    Glucose 04/24/2024 90  65 - 99 mg/dL Final    BUN 04/24/2024 14  6 - 20 mg/dL Final    Creatinine 04/24/2024 0.98  0.76 - 1.27 mg/dL Final    EGFR Result 04/24/2024 88.8  >60.0 mL/min/1.73 Final    Comment: GFR Normal >60  Chronic Kidney Disease <60  Kidney Failure <15      BUN/Creatinine Ratio 04/24/2024 14.3  7.0 - 25.0 Final    Sodium 04/24/2024 136  136 - 145 mmol/L Final    Potassium 04/24/2024 3.9  3.5 - 5.2 mmol/L Final     Comment: Slight hemolysis detected by analyzer. Result may be  falsely elevated.      Chloride 04/24/2024 99  98 - 107 mmol/L Final    Total CO2 04/24/2024 27.0  22.0 - 29.0 mmol/L Final    Calcium 04/24/2024 9.8  8.6 - 10.5 mg/dL Final    Total Protein 04/24/2024 7.5  6.0 - 8.5 g/dL Final    Albumin 04/24/2024 4.5  3.5 - 5.2 g/dL Final    Globulin 04/24/2024 3.0  gm/dL Final    A/G Ratio 04/24/2024 1.5  g/dL Final    Total Bilirubin 04/24/2024 0.6  0.0 - 1.2 mg/dL Final    Alkaline Phosphatase 04/24/2024 62  39 - 117 U/L Final    AST (SGOT) 04/24/2024 23  1 - 40 U/L Final    ALT (SGPT) 04/24/2024 28  1 - 41 U/L Final    Hemoglobin A1C 04/24/2024 5.50  4.80 - 5.60 % Final    Comment: Hemoglobin A1C Ranges:  Increased Risk for Diabetes  5.7% to 6.4%  Diabetes                     >= 6.5%  Diabetic Goal                < 7.0%      TSH 04/24/2024 3.430  0.270 - 4.200 uIU/mL Final    Uric Acid 04/24/2024 6.2  3.4 - 7.0 mg/dL Final      Assessment & Plan   Diagnoses and all orders for this visit:    1. Health care maintenance (Primary)    2. Asymptomatic varicose veins of both lower extremities    3. Prediabetes    4. High blood monocyte count  -     CBC & Differential; Future    5. Secondary hypertension                 1. Preventative counseling- continue to eat healthy and exercise   2. Varicosities- suggest compression stockings   3. Prediabetes- very well controlled, continue ryblesus   4. Elevated monocyte count- recheck in a month  5. HTN- well controlled     Defers pneumonia vaccine

## 2024-05-06 RX ORDER — ALLOPURINOL 300 MG/1
TABLET ORAL
Qty: 90 TABLET | Refills: 3 | Status: SHIPPED | OUTPATIENT
Start: 2024-05-06

## 2024-10-22 ENCOUNTER — TELEPHONE (OUTPATIENT)
Dept: INTERNAL MEDICINE | Facility: CLINIC | Age: 59
End: 2024-10-22
Payer: OTHER GOVERNMENT

## 2024-10-22 ENCOUNTER — TELEPHONE (OUTPATIENT)
Dept: INTERNAL MEDICINE | Facility: CLINIC | Age: 59
End: 2024-10-22

## 2024-10-22 NOTE — TELEPHONE ENCOUNTER
Caller: Sal Gamboa    Relationship: Self    Best call back number:     [phone] (Home)       What was the call regarding: PATIENT RESCHEDULED APPT / DOES HIS LABS NEED TO BE RESCHEDULED ALSO, OR WILL THIS BE SUFFICIENT ?       Is it okay if the provider responds through MyChart: CALL IF NEEDED

## 2024-11-11 DIAGNOSIS — I15.9 SECONDARY HYPERTENSION: ICD-10-CM

## 2024-11-11 DIAGNOSIS — R73.03 PREDIABETES: Primary | ICD-10-CM

## 2024-11-12 LAB
ALBUMIN SERPL-MCNC: 4.4 G/DL (ref 3.5–5.2)
ALBUMIN/GLOB SERPL: 1.4 G/DL
ALP SERPL-CCNC: 60 U/L (ref 39–117)
ALT SERPL-CCNC: 40 U/L (ref 1–41)
AST SERPL-CCNC: 30 U/L (ref 1–40)
BILIRUB SERPL-MCNC: 0.6 MG/DL (ref 0–1.2)
BUN SERPL-MCNC: 11 MG/DL (ref 8–23)
BUN/CREAT SERPL: 10.6 (ref 7–25)
CALCIUM SERPL-MCNC: 9.7 MG/DL (ref 8.6–10.5)
CHLORIDE SERPL-SCNC: 100 MMOL/L (ref 98–107)
CO2 SERPL-SCNC: 28.7 MMOL/L (ref 22–29)
CREAT SERPL-MCNC: 1.04 MG/DL (ref 0.76–1.27)
EGFRCR SERPLBLD CKD-EPI 2021: 82.2 ML/MIN/1.73
GLOBULIN SER CALC-MCNC: 3.1 GM/DL
GLUCOSE SERPL-MCNC: 113 MG/DL (ref 65–99)
HBA1C MFR BLD: 5.9 % (ref 4.8–5.6)
POTASSIUM SERPL-SCNC: 3.8 MMOL/L (ref 3.5–5.2)
PROT SERPL-MCNC: 7.5 G/DL (ref 6–8.5)
SODIUM SERPL-SCNC: 140 MMOL/L (ref 136–145)

## 2024-11-14 LAB
CHOLEST SERPL-MCNC: 140 MG/DL (ref 0–200)
HDLC SERPL-MCNC: 41 MG/DL (ref 40–60)
LDLC SERPL CALC-MCNC: 74 MG/DL (ref 0–100)
LDLC/HDLC SERPL: 1.72 {RATIO}
PSA SERPL-MCNC: 1.28 NG/ML (ref 0–4)
TRIGL SERPL-MCNC: 143 MG/DL (ref 0–150)
VLDLC SERPL CALC-MCNC: 25 MG/DL (ref 5–40)
WRITTEN AUTHORIZATION: NORMAL

## 2024-11-18 ENCOUNTER — OFFICE VISIT (OUTPATIENT)
Dept: INTERNAL MEDICINE | Facility: CLINIC | Age: 59
End: 2024-11-18
Payer: OTHER GOVERNMENT

## 2024-11-18 VITALS
WEIGHT: 253 LBS | BODY MASS INDEX: 30.81 KG/M2 | HEIGHT: 76 IN | SYSTOLIC BLOOD PRESSURE: 124 MMHG | DIASTOLIC BLOOD PRESSURE: 78 MMHG | HEART RATE: 88 BPM | OXYGEN SATURATION: 98 %

## 2024-11-18 DIAGNOSIS — R73.03 PREDIABETES: Primary | ICD-10-CM

## 2024-11-18 DIAGNOSIS — Z00.00 HEALTH CARE MAINTENANCE: ICD-10-CM

## 2024-11-18 DIAGNOSIS — E79.0 ELEVATED URIC ACID IN BLOOD: ICD-10-CM

## 2024-11-18 DIAGNOSIS — I10 PRIMARY HYPERTENSION: ICD-10-CM

## 2024-11-18 PROCEDURE — 99214 OFFICE O/P EST MOD 30 MIN: CPT | Performed by: NURSE PRACTITIONER

## 2024-11-18 NOTE — PROGRESS NOTES
Subjective   Sal Gamboa is a 59 y.o. male.      History of Present Illness   The patient is here today to F/U on lab work. He has been feeling ok. Appetite has been varying.     HTN-Pt is doing well with current medication regimen, denies adverse reactions, compliant with medication schedule.   Prediabetes- Pt is doing well with current medication regimen, denies adverse reactions, compliant with medication schedule.     Varicosities- PCP discussed with vascular and got him compression stockings. He thinks he may need imaging per them, he is in no pain.     Left great toe nail removed- per podiatry.     [facility] also [facility] cream. It was supposed to be a gel.     The following portions of the patient's history were reviewed and updated as appropriate: allergies, current medications, past family history, past medical history, past social history, past surgical history and problem list.    Review of Systems   Constitutional: Negative.    Respiratory:  Positive for cough (occ with AR). Negative for shortness of breath and wheezing.    Cardiovascular: Negative.    Allergic/Immunologic: Positive for environmental allergies. Negative for food allergies and immunocompromised state.   Psychiatric/Behavioral: Negative.         Objective   Physical Exam  Constitutional:       Appearance: Normal appearance. He is well-developed.   Eyes:      Conjunctiva/sclera:      Right eye: Right conjunctiva is injected.      Left eye: Left conjunctiva is injected.   Neck:      Thyroid: No thyromegaly.   Cardiovascular:      Rate and Rhythm: Normal rate and regular rhythm.      Heart sounds: Normal heart sounds.   Pulmonary:      Effort: Pulmonary effort is normal.      Breath sounds: Normal breath sounds.   Musculoskeletal:      Cervical back: Normal range of motion and neck supple.   Lymphadenopathy:      Cervical: No cervical adenopathy.   Skin:     General: Skin is warm and dry.   Neurological:      Mental Status: He is  alert.   Psychiatric:         Behavior: Behavior normal.         Thought Content: Thought content normal.         Judgment: Judgment normal.         Vitals:    11/18/24 0808   BP: 124/78   Pulse: 88   SpO2: 98%     Body mass index is 30.81 kg/m².    Current Outpatient Medications:     allopurinol (ZYLOPRIM) 300 MG tablet, TAKE 1 TABLET DAILY, Disp: 90 tablet, Rfl: 3    cholecalciferol (VITAMIN D3) 25 MCG (1000 UT) tablet, Take 2 tablets by mouth Daily., Disp: , Rfl:     hydroCHLOROthiazide (HYDRODIURIL) 25 MG tablet, TAKE 1 TABLET DAILY, Disp: 90 tablet, Rfl: 3    Loratadine 10 MG capsule, Take  by mouth., Disp: , Rfl:     polyethyl glycol-propyl glycol (SYSTANE) 0.4-0.3 % solution ophthalmic solution (artificial tears), Every 1 (One) Hour As Needed., Disp: , Rfl:     Rybelsus 14 MG tablet, TAKE 1 TABLET DAILY, Disp: 90 tablet, Rfl: 3   Orders Only on 11/11/2024   Component Date Value Ref Range Status    Glucose 11/11/2024 113 (H)  65 - 99 mg/dL Final    BUN 11/11/2024 11  8 - 23 mg/dL Final    Creatinine 11/11/2024 1.04  0.76 - 1.27 mg/dL Final    EGFR Result 11/11/2024 82.2  >60.0 mL/min/1.73 Final    Comment: GFR Normal >60  Chronic Kidney Disease <60  Kidney Failure <15      BUN/Creatinine Ratio 11/11/2024 10.6  7.0 - 25.0 Final    Sodium 11/11/2024 140  136 - 145 mmol/L Final    Potassium 11/11/2024 3.8  3.5 - 5.2 mmol/L Final    Chloride 11/11/2024 100  98 - 107 mmol/L Final    Total CO2 11/11/2024 28.7  22.0 - 29.0 mmol/L Final    Calcium 11/11/2024 9.7  8.6 - 10.5 mg/dL Final    Total Protein 11/11/2024 7.5  6.0 - 8.5 g/dL Final    Albumin 11/11/2024 4.4  3.5 - 5.2 g/dL Final    Globulin 11/11/2024 3.1  gm/dL Final    A/G Ratio 11/11/2024 1.4  g/dL Final    Total Bilirubin 11/11/2024 0.6  0.0 - 1.2 mg/dL Final    Alkaline Phosphatase 11/11/2024 60  39 - 117 U/L Final    AST (SGOT) 11/11/2024 30  1 - 40 U/L Final    ALT (SGPT) 11/11/2024 40  1 - 41 U/L Final    Hemoglobin A1C 11/11/2024 5.90 (H)  4.80 - 5.60  % Final    Comment: Hemoglobin A1C Ranges:  Increased Risk for Diabetes  5.7% to 6.4%  Diabetes                     >= 6.5%  Diabetic Goal                < 7.0%      Total Cholesterol 11/11/2024 140  0 - 200 mg/dL Final    Comment: Cholesterol Reference Ranges  (U.S. Department of Health and Human Services ATP III  Classifications)  Desirable          <200 mg/dL  Borderline High    200-239 mg/dL  High Risk          >240 mg/dL  Triglyceride Reference Ranges  (U.S. Department of Health and Human Services ATP III  Classifications)  Normal           <150 mg/dL  Borderline High  150-199 mg/dL  High             200-499 mg/dL  Very High        >500 mg/dL  HDL Reference Ranges  (U.S. Department of Health and Human Services ATP III  Classifications)  Low     <40 mg/dl (major risk factor for CHD)  High    >60 mg/dl ('negative' risk factor for CHD)  LDL Reference Ranges  (U.S. Department of Health and Human Services ATP III  Classifications)  Optimal          <100 mg/dL  Near Optimal     100-129 mg/dL  Borderline High  130-159 mg/dL  High             160-189 mg/dL  Very High        >189 mg/dL      Triglycerides 11/11/2024 143  0 - 150 mg/dL Final    HDL Cholesterol 11/11/2024 41  40 - 60 mg/dL Final    VLDL Cholesterol Brian 11/11/2024 25  5 - 40 mg/dL Final    LDL Chol Calc (NIH) 11/11/2024 74  0 - 100 mg/dL Final    LDL/HDL RATIO 11/11/2024 1.72   Final    PSA 11/11/2024 1.280  0.000 - 4.000 ng/mL Final    Comment: Results may be falsely decreased if patient taking Biotin.  Testing Method: [employer] Electrochemiluminescence  Immunoassay(ECLIA)  Values obtained with different assay methods or kits cannot  be used interchangeably.      Written Authorization 11/11/2024 Comment   Final    Comment: Written Authorization Received.  Authorization received from WRITTEN REQUEST 11-  Logged by [facility]   Diagnoses and all orders for this visit:    1. Prediabetes (Primary)  -      Comprehensive Metabolic Panel; Future  -     Hemoglobin A1c; Future  -     Lipid Panel With LDL / HDL Ratio; Future  -     TSH Rfx On Abnormal To Free T4; Future  -     Uric Acid; Future    2. Primary hypertension  -     Comprehensive Metabolic Panel; Future  -     Hemoglobin A1c; Future  -     Lipid Panel With LDL / HDL Ratio; Future  -     TSH Rfx On Abnormal To Free T4; Future  -     Uric Acid; Future    3. Elevated uric acid in blood  -     Comprehensive Metabolic Panel; Future  -     Hemoglobin A1c; Future  -     Lipid Panel With LDL / HDL Ratio; Future  -     TSH Rfx On Abnormal To Free T4; Future  -     Uric Acid; Future    4. Health care maintenance  -     Comprehensive Metabolic Panel; Future  -     Hemoglobin A1c; Future  -     Lipid Panel With LDL / HDL Ratio; Future  -     TSH Rfx On Abnormal To Free T4; Future  -     Uric Acid; Future               1. HTN- Well controlled  2. Prediabetes- he will cut back on sugars and carbs, increase activity if able       Pt aware of redness in eyes and is following with eye spec  Discussed vaccines.

## 2024-11-20 RX ORDER — KETOCONAZOLE 20 MG/G
GEL TOPICAL
Qty: 45 G | Refills: 0 | Status: SHIPPED | OUTPATIENT
Start: 2024-11-20 | End: 2024-11-20 | Stop reason: SDUPTHER

## 2024-11-20 RX ORDER — KETOCONAZOLE 20 MG/G
GEL TOPICAL
Qty: 45 G | Refills: 0 | Status: SHIPPED | OUTPATIENT
Start: 2024-11-20

## 2024-11-22 RX ORDER — HYDROCHLOROTHIAZIDE 25 MG/1
25 TABLET ORAL DAILY
Qty: 90 TABLET | Refills: 3 | Status: SHIPPED | OUTPATIENT
Start: 2024-11-22

## 2025-02-17 RX ORDER — ORAL SEMAGLUTIDE 14 MG/1
TABLET ORAL
Qty: 90 TABLET | Refills: 3 | Status: SHIPPED | OUTPATIENT
Start: 2025-02-17

## 2025-05-01 RX ORDER — ALLOPURINOL 300 MG/1
300 TABLET ORAL DAILY
Qty: 90 TABLET | Refills: 3 | Status: SHIPPED | OUTPATIENT
Start: 2025-05-01

## 2025-06-04 ENCOUNTER — OFFICE VISIT (OUTPATIENT)
Dept: INTERNAL MEDICINE | Facility: CLINIC | Age: 60
End: 2025-06-04
Payer: OTHER GOVERNMENT

## 2025-06-04 VITALS
WEIGHT: 244 LBS | SYSTOLIC BLOOD PRESSURE: 110 MMHG | HEIGHT: 76 IN | OXYGEN SATURATION: 97 % | BODY MASS INDEX: 29.71 KG/M2 | DIASTOLIC BLOOD PRESSURE: 72 MMHG | HEART RATE: 86 BPM

## 2025-06-04 DIAGNOSIS — Z82.49 FAMILY HISTORY OF HEART VALVE ABNORMALITY: ICD-10-CM

## 2025-06-04 DIAGNOSIS — R73.03 PREDIABETES: ICD-10-CM

## 2025-06-04 DIAGNOSIS — Z12.11 COLON CANCER SCREENING: ICD-10-CM

## 2025-06-04 DIAGNOSIS — Z00.00 HEALTH CARE MAINTENANCE: Primary | ICD-10-CM

## 2025-06-04 DIAGNOSIS — I10 PRIMARY HYPERTENSION: ICD-10-CM

## 2025-06-04 DIAGNOSIS — R19.5 LOOSE STOOLS: ICD-10-CM

## 2025-06-04 RX ORDER — LORATADINE 10 MG/1
10 CAPSULE, LIQUID FILLED ORAL DAILY
Qty: 90 EACH | Refills: 2 | Status: SHIPPED | OUTPATIENT
Start: 2025-06-04

## 2025-06-04 NOTE — PROGRESS NOTES
Subjective   Sal Gamboa is a 60 y.o. male.     History of Present Illness   The patient is here today for CPE and lab work F/U. Feeling well. 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 He walks a lot, about 2.7 miles a day. Some strength training.     Prediabetes-Pt is doing well with current medication regimen, denies adverse reactions, compliant with medication schedule.   HTN-Pt is doing well with current medication regimen, denies adverse reactions, compliant with medication schedule.   Gout- Pt is doing well with current medication regimen, denies adverse reactions, compliant with medication schedule.      Started Sunday am - possibly after meat loaf on Saturday evening. Onions bother him. He would have sulfar burps. Loose stools. No fever or abd pain. Stools are slowly coming back to normal.      The following portions of the patient's history were reviewed and updated as appropriate: allergies, current medications, past family history, past medical history, past social history, past surgical history and problem list.    Review of Systems   Constitutional:  Positive for fatigue. Negative for chills and fever.   HENT:  Positive for rhinorrhea. Negative for ear pain and sore throat.    Eyes: Negative.    Respiratory:  Negative for cough and shortness of breath.    Cardiovascular: Negative.    Gastrointestinal:  Positive for diarrhea (see note). Negative for abdominal distention, abdominal pain, anal bleeding, blood in stool, constipation, nausea, rectal pain, vomiting, GERD and indigestion.   Endocrine: Negative for cold intolerance and heat intolerance.   Genitourinary:  Negative for decreased urine volume, difficulty urinating, discharge, dysuria, flank pain, frequency, genital sores, hematuria, penile pain, erectile dysfunction, testicular pain and urgency.   Musculoskeletal: Negative.    Skin: Negative.    Allergic/Immunologic: Positive for environmental allergies. Negative for food allergies.    Neurological:  Positive for light-headedness (with getting up too quickly recently X 1).   Hematological: Negative.    Psychiatric/Behavioral:  Negative for dysphoric mood and suicidal ideas. The patient is not nervous/anxious.        Objective   Physical Exam  Constitutional:       Appearance: Normal appearance. He is well-developed.   HENT:      Right Ear: Hearing, ear canal and external ear normal. There is impacted cerumen.      Left Ear: Hearing, ear canal and external ear normal. There is impacted cerumen.      Nose: Nose normal.      Mouth/Throat:      Pharynx: Uvula midline.   Eyes:      General: Lids are normal.      Conjunctiva/sclera: Conjunctivae normal.      Pupils: Pupils are equal, round, and reactive to light.   Neck:      Thyroid: No thyromegaly.      Vascular: No carotid bruit.      Trachea: Trachea normal.   Cardiovascular:      Rate and Rhythm: Normal rate and regular rhythm.      Heart sounds: Normal heart sounds.   Pulmonary:      Effort: Pulmonary effort is normal.      Breath sounds: Normal breath sounds.   Abdominal:      General: Bowel sounds are normal.      Palpations: Abdomen is soft.      Tenderness: There is no abdominal tenderness.   Musculoskeletal:         General: Normal range of motion.      Cervical back: Normal range of motion and neck supple.   Lymphadenopathy:      Cervical: No cervical adenopathy.      Upper Body:      Right upper body: No supraclavicular adenopathy.      Left upper body: No supraclavicular adenopathy.   Skin:     General: Skin is warm and dry.   Neurological:      Mental Status: He is alert and oriented to person, place, and time.      Cranial Nerves: No cranial nerve deficit.      Sensory: No sensory deficit.      Deep Tendon Reflexes:      Reflex Scores:       Patellar reflexes are 2+ on the right side and 2+ on the left side.  Psychiatric:         Speech: Speech normal.         Behavior: Behavior normal.         Thought Content: Thought content normal.          Judgment: Judgment normal.         Vitals:    06/04/25 0754   BP: 110/72   Pulse: 86   SpO2: 97%     Body mass index is 29.71 kg/m².      Current Outpatient Medications:     allopurinol (ZYLOPRIM) 300 MG tablet, TAKE 1 TABLET DAILY, Disp: 90 tablet, Rfl: 3    cholecalciferol (VITAMIN D3) 25 MCG (1000 UT) tablet, Take 2 tablets by mouth Daily., Disp: , Rfl:     hydroCHLOROthiazide 25 MG tablet, TAKE 1 TABLET DAILY, Disp: 90 tablet, Rfl: 3    Loratadine 10 MG capsule, Take 1 capsule by mouth Daily., Disp: 90 each, Rfl: 2    polyethyl glycol-propyl glycol (SYSTANE) 0.4-0.3 % solution ophthalmic solution (artificial tears), Every 1 (One) Hour As Needed., Disp: , Rfl:     Rybelsus 14 MG tablet, TAKE 1 TABLET DAILY, Disp: 90 tablet, Rfl: 3   Assessment & Plan   Diagnoses and all orders for this visit:    1. Health care maintenance (Primary)  -     CBC & Differential; Future  -     Comprehensive Metabolic Panel; Future  -     Hemoglobin A1c; Future  -     PSA DIAGNOSTIC; Future    2. Loose stools    3. Family history of heart valve abnormality    4. Prediabetes  -     CBC & Differential; Future  -     Comprehensive Metabolic Panel; Future  -     Hemoglobin A1c; Future  -     PSA DIAGNOSTIC; Future    5. Colon cancer screening  -     Cologuard - Stool, Per Rectum; Future    6. Primary hypertension  -     CBC & Differential; Future  -     Comprehensive Metabolic Panel; Future  -     Hemoglobin A1c; Future  -     PSA DIAGNOSTIC; Future    Other orders  -     Loratadine 10 MG capsule; Take 1 capsule by mouth Daily.  Dispense: 90 each; Refill: 2        Results Encounter on 05/18/2025   Component Date Value Ref Range Status    Glucose 05/28/2025 103 (H)  65 - 99 mg/dL Final    BUN 05/28/2025 12.0  8.0 - 23.0 mg/dL Final    Creatinine 05/28/2025 1.02  0.76 - 1.27 mg/dL Final    EGFR Result 05/28/2025 83.6  >60.0 mL/min/1.73 Final    Comment: GFR Categories in Chronic Kidney Disease (CKD)  GFR Category          GFR  (mL/min/1.73)    Interpretation  G1                    90 or greater        Normal or high  (1)  G2                    60-89                Mild decrease  (1)  G3a                   45-59                Mild to moderate  decrease  G3b                   30-44                Moderate to  severe decrease  G4                    15-29                Severe decrease  G5                    14 or less           Kidney failure  (1)In the absence of evidence of kidney disease, neither  GFR category G1 or G2 fulfill the criteria for CKD.  eGFR calculation 2021 CKD-EPI creatinine equation, which  does not include race as a factor      BUN/Creatinine Ratio 05/28/2025 11.8  7.0 - 25.0 Final    Sodium 05/28/2025 136  136 - 145 mmol/L Final    Potassium 05/28/2025 4.2  3.5 - 5.2 mmol/L Final    Comment: Slight hemolysis detected by analyzer. Result may be  falsely elevated.      Chloride 05/28/2025 98  98 - 107 mmol/L Final    Total CO2 05/28/2025 26.7  22.0 - 29.0 mmol/L Final    Calcium 05/28/2025 9.7  8.6 - 10.5 mg/dL Final    Total Protein 05/28/2025 7.4  6.0 - 8.5 g/dL Final    Albumin 05/28/2025 4.3  3.5 - 5.2 g/dL Final    Globulin 05/28/2025 3.1  gm/dL Final    A/G Ratio 05/28/2025 1.4  g/dL Final    Total Bilirubin 05/28/2025 0.7  0.0 - 1.2 mg/dL Final    Alkaline Phosphatase 05/28/2025 68  39 - 117 U/L Final    AST (SGOT) 05/28/2025 22  1 - 40 U/L Final    Comment: Slight hemolysis detected by analyzer. Result may be  falsely elevated.      ALT (SGPT) 05/28/2025 22  1 - 41 U/L Final    Hemoglobin A1C 05/28/2025 5.60  4.80 - 5.60 % Final    Comment: Hemoglobin A1C Ranges:  Increased Risk for Diabetes  5.7% to 6.4%  Diabetes                     >= 6.5%  Diabetic Goal                < 7.0%      Total Cholesterol 05/28/2025 134  0 - 200 mg/dL Final    Comment: Cholesterol Reference Ranges  (U.S. Department of Health and Human Services ATP III  Classifications)  Desirable          <200 mg/dL  Borderline High    200-239  mg/dL  High Risk          >240 mg/dL  Triglyceride Reference Ranges  (U.S. Department of Health and Human Services ATP III  Classifications)  Normal           <150 mg/dL  Borderline High  150-199 mg/dL  High             200-499 mg/dL  Very High        >500 mg/dL  HDL Reference Ranges  (U.S. Department of Health and Human Services ATP III  Classifications)  Low     <40 mg/dl (major risk factor for CHD)  High    >60 mg/dl ('negative' risk factor for CHD)  LDL Reference Ranges  (U.S. Department of Health and Human Services ATP III  Classifications)  Optimal          <100 mg/dL  Near Optimal     100-129 mg/dL  Borderline High  130-159 mg/dL  High             160-189 mg/dL  Very High        >189 mg/dL  LDL is calculated using the NIH LDL-C calculation.      Triglycerides 05/28/2025 109  0 - 150 mg/dL Final    HDL Cholesterol 05/28/2025 40  40 - 60 mg/dL Final    VLDL Cholesterol Brian 05/28/2025 20  5 - 40 mg/dL Final    LDL Chol Calc ([facility]) 05/28/2025 74  0 - 100 mg/dL Final    LDL/HDL RATIO 05/28/2025 1.81   Final    TSH 05/28/2025 2.990  0.270 - 4.200 uIU/mL Final    Uric Acid 05/28/2025 5.6  3.4 - 7.0 mg/dL Final             1. Preventative counseling- continue to work on healthy diet and exercise   2. Loose stools- resolving on its own, ok to try a probiotic.   3. Fam hx of valve disorders- he will try to find out which valve   4. Prediabetes- well managed

## (undated) DEVICE — CANN NASL CO2 TRULINK W/O2 A/

## (undated) DEVICE — LASSO POLYPECTOMY SNARE: Brand: LASSO

## (undated) DEVICE — VIAL FORMLN CAP 10PCT 20ML

## (undated) DEVICE — JACKT LAB F/R KNIT CUFF/COLR XLG BLU

## (undated) DEVICE — FLEX ADVANTAGE 1500CC: Brand: FLEX ADVANTAGE

## (undated) DEVICE — GOWN ISOL W/THUMB UNIV BLU BX/15

## (undated) DEVICE — Device

## (undated) DEVICE — SINGLE-USE BIOPSY FORCEPS: Brand: RADIAL JAW 4

## (undated) DEVICE — KT ORCA ORCAPOD DISP STRL